# Patient Record
Sex: FEMALE | Race: WHITE | NOT HISPANIC OR LATINO | Employment: FULL TIME
[De-identification: names, ages, dates, MRNs, and addresses within clinical notes are randomized per-mention and may not be internally consistent; named-entity substitution may affect disease eponyms.]

---

## 2017-06-30 DIAGNOSIS — L40.9 PSORIASIS: ICD-10-CM

## 2017-06-30 NOTE — TELEPHONE ENCOUNTER
fluocinolone acetonide 0.01 % OIL 20 mL 1 6/24/2016       Left a message to pt return our call, needs to schedule an appointment. Last OV was on 06/03/2016        Last Written Prescription Date: 06/24/2016  Last Fill Quantity: 20 ml,  # refills: 1   Last Office Visit with [facility], [facility] or [facility] prescribing provider: 06/03/2016

## 2017-07-11 RX ORDER — FLUOCINOLONE ACETONIDE 0.11 MG/ML
OIL AURICULAR (OTIC)
Qty: 20 ML | Refills: 0 | Status: SHIPPED | OUTPATIENT
Start: 2017-07-11 | End: 2018-05-18

## 2017-08-05 ENCOUNTER — HEALTH MAINTENANCE LETTER (OUTPATIENT)
Age: 57
End: 2017-08-05

## 2018-05-18 ENCOUNTER — MYC MEDICAL ADVICE (OUTPATIENT)
Dept: FAMILY MEDICINE | Facility: CLINIC | Age: 58
End: 2018-05-18

## 2018-05-18 DIAGNOSIS — L40.9 PSORIASIS: ICD-10-CM

## 2018-05-18 RX ORDER — FLUOCINOLONE ACETONIDE 0.11 MG/ML
OIL AURICULAR (OTIC)
Qty: 20 ML | Refills: 0 | Status: SHIPPED | OUTPATIENT
Start: 2018-05-18 | End: 2018-08-24

## 2018-05-18 RX ORDER — TRIAMCINOLONE ACETONIDE 5 MG/G
CREAM TOPICAL
Qty: 60 G | Refills: 0 | Status: SHIPPED | OUTPATIENT
Start: 2018-05-18 | End: 2018-08-24

## 2018-05-18 NOTE — TELEPHONE ENCOUNTER
Dr. Wellington:    Patient requesting pended medications.   I sent [employer] response to patient letting her know she is over-due for appt with you (2 YEARS AGO)    Advised that she call to schedule.  Please address pended medications.    Anay Vilchis RN

## 2018-08-11 ENCOUNTER — HEALTH MAINTENANCE LETTER (OUTPATIENT)
Age: 58
End: 2018-08-11

## 2018-08-16 ENCOUNTER — HOSPITAL ENCOUNTER (OUTPATIENT)
Dept: MAMMOGRAPHY | Facility: CLINIC | Age: 58
End: 2018-08-16
Attending: INTERNAL MEDICINE
Payer: COMMERCIAL

## 2018-08-16 DIAGNOSIS — Z12.31 VISIT FOR SCREENING MAMMOGRAM: ICD-10-CM

## 2018-08-16 PROCEDURE — 77067 SCR MAMMO BI INCL CAD: CPT

## 2018-08-23 NOTE — PROGRESS NOTES
SUBJECTIVE:   CC: Joanie Multani is an 57 year old woman who presents for preventive health visit.     Healthy Habits:    Do you get at least three servings of calcium containing foods daily (dairy, green leafy vegetables, etc.)? yes    Amount of exercise or daily activities, outside of work: *2 days    Problems taking medications regularly No    Medication side effects: No    Have you had an eye exam in the past two years? yes    Do you see a dentist twice per year? no    Do you have sleep apnea, excessive snoring or daytime drowsiness?no      Patient is trying to lose weight   Ideal protein program  No other issues  Skin issues are stable    Today's PHQ-2 Score:   PHQ-2 (  [employer]) 6/3/2016 6/3/2016   Q1: Little interest or pleasure in doing things 1 1   Q2: Feeling down, depressed or hopeless 1 1   PHQ-2 Score 2 2   Q1: Little interest or pleasure in doing things - -   Q2: Feeling down, depressed or hopeless - -   PHQ-2 Score - -       Abuse: Current or Past(Physical, Sexual or Emotional)- No  Do you feel safe in your environment - Yes    Social History   Substance Use Topics     Smoking status: Never Smoker     Smokeless tobacco: Never Used     Alcohol use Yes      Comment: 3-5 drinks/week     If you drink alcohol do you typically have >3 drinks per day or >7 drinks per week? No                     Reviewed orders with patient.  Reviewed health maintenance and updated orders accordingly - Yes  Patient Active Problem List   Diagnosis     Allergic rhinitis     Chondromalacia of patella     Obesity     CARDIOVASCULAR SCREENING; LDL GOAL LESS THAN 160     Psoriasis     Seasonal allergic rhinitis     Past Surgical History:   Procedure Laterality Date     C DEXA,BONE DENSITY,APPENDICULR SKELTN      nl bmd     C NONSPECIFIC PROCEDURE      Tonsillectomy     C NONSPECIFIC PROCEDURE       [id number]     C NONSPECIFIC PROCEDURE  3/08    lt radial closed fracture      COLONOSCOPY      nl       Social History    Substance Use Topics     Smoking status: Never Smoker     Smokeless tobacco: Never Used     Alcohol use Yes      Comment: 3-5 drinks/week     Family History   Problem Relation Age of Onset     Hypertension Maternal Grandfather      Diabetes Maternal Grandfather      Diabetes Maternal Grandmother      Diabetes Paternal Grandfather      Depression Father      Circulatory Maternal Grandfather      bleeding prob     Depression Daughter          Current Outpatient Prescriptions   Medication Sig Dispense Refill     cetirizine-psuedoePHEDrine (ZYRTEC-D) 5-120 MG per tablet Take 1 tablet by mouth daily 60 tablet 2     fluocinolone (SYNALAR) 0.01 % external solution Please re issue in drop formula rather than squeeze bottle 30 mL 1     fluocinolone acetonide 0.01 % OIL INSTILL 5 DROPS INTO THE AFFECTED EAR TWICE DAILY 20 mL 3     mometasone (NASONEX) 50 MCG/ACT nasal spray Spray 2 sprays into both nostrils daily (Patient taking differently: Spray 2 sprays into both nostrils daily as needed ) 1 Box 1     triamcinolone (KENALOG) 0.5 % cream Apply sparingly to affected area three times daily. 60 g 3       Patient over age 50, mutual decision to screen reflected in health maintenance.    Pertinent mammograms are reviewed under the imaging tab.  History of abnormal Pap smear: NO - age 30-65 PAP every 5 years with negative HPV co-testing recommended  PAP / HPV Latest Ref Rng & Units 6/3/2016 12/17/2012 1/4/2010   PAP - NIL NIL NIL   HPV 16 DNA NEG Negative - -   HPV 18 DNA NEG Negative - -   OTHER HR HPV NEG Negative - -     Reviewed and updated as needed this visit by clinical staff  Allergies  Meds  Problems         Reviewed and updated as needed this visit by Provider  Allergies  Meds  Problems            ROS:  10 point ROS of systems including Constitutional, Eyes, Respiratory, Cardiovascular, Gastroenterology, Genitourinary, Integumentary, Muscularskeletal, Psychiatric were all negative except for pertinent positives  "noted in my HPI.    OBJECTIVE:   /90 (Cuff Size: Adult Large)  Pulse 68  Ht 5' 5\" (1.651 m)  Wt 213 lb (96.6 kg)  LMP 05/14/2014  SpO2 97%  BMI 35.45 kg/m2  EXAM:  GENERAL APPEARANCE: healthy, alert and no distress  EYES: Eyes grossly normal to inspection, PERRL and conjunctivae and sclerae normal  HENT: ear canals and TM's normal, nose and mouth without ulcers or lesions, oropharynx clear and oral mucous membranes moist  NECK: no adenopathy, no asymmetry, masses, or scars and thyroid normal to palpation  RESP: lungs clear to auscultation - no rales, rhonchi or wheezes  BREAST: normal without masses, tenderness or nipple discharge and no palpable axillary masses or adenopathy  CV: regular rate and rhythm, normal S1 S2, no S3 or S4, no murmur, click or rub, no peripheral edema and peripheral pulses strong  ABDOMEN: soft, nontender, no hepatosplenomegaly, no masses and bowel sounds normal  MS: no musculoskeletal defects are noted and gait is age appropriate without ataxia  SKIN: no suspicious lesions or rashes  NEURO: Normal strength and tone, sensory exam grossly normal, mentation intact and speech normal  PSYCH: mentation appears normal and affect normal/bright        ASSESSMENT/PLAN:   Joanie was seen today for physical.    Diagnoses and all orders for this visit:    Routine general medical examination at a health care facility  Mammogram   Colon uptodate, due next year   Pap done in 2016    She will do high protein diet  She will she   Psoriasis  -     Comprehensive metabolic panel  -     fluocinolone acetonide 0.01 % OIL; INSTILL 5 DROPS INTO THE AFFECTED EAR TWICE DAILY  -     triamcinolone (KENALOG) 0.5 % cream; Apply sparingly to affected area three times daily.  -     Vitamin D Deficiency  -     Vitamin B12    Chondromalacia of patella, unspecified laterality  -     Comprehensive metabolic panel  Weight loss advised  Bilateral impacted cerumen   fluocinolone acetonide 0.01 % OIL; INSTILL 5 DROPS " "INTO THE AFFECTED EAR TWICE DAILY  Screening for HIV (human immunodeficiency virus)  -     HIV Screening    CARDIOVASCULAR SCREENING; LDL GOAL LESS THAN 160  -     Lipid panel reflex to direct LDL Fasting        COUNSELING:   Reviewed preventive health counseling, as reflected in patient instructions       Regular exercise       Healthy diet/nutrition    BP Readings from Last 1 Encounters:   08/24/18 138/90     Estimated body mass index is 35.45 kg/(m^2) as calculated from the following:    Height as of this encounter: 5' 5\" (1.651 m).    Weight as of this encounter: 213 lb (96.6 kg).      Weight management plan: Discussed healthy diet and exercise guidelines and patient will follow up in 6 months in clinic to re-evaluate.     reports that she has never smoked. She has never used smokeless tobacco.      Counseling Resources:  ATP IV Guidelines  Pooled Cohorts Equation Calculator  Breast Cancer Risk Calculator  FRAX Risk Assessment  ICSI Preventive Guidelines  Dietary Guidelines for Americans, 2010  USDA's MyPlate  ASA Prophylaxis  Lung CA Screening    Elsa Wellington MD  [facility]  "

## 2018-08-23 NOTE — PATIENT INSTRUCTIONS
Calcium is present in   Cheese  2. Yogurt  3. Milk  4. Sardines  5. Dark leafy greens like spinach, kale, turnips, and hilaria greens  6. Fortified cereals such as Total, Raisin Bran, Corn Flakes (They have a lot of calcium in one serving.)  7. Fortified orange juice  8. Soybeans  9. Fortified soymilk (Not all soymilk is a good source of calcium, so it's best to check the label.)  10. Enriched breads, grains, and waffles    Centrum for adults    Vicks Vaporub  Preventive Health Recommendations  Female Ages 50 - 64    Yearly exam: See your health care provider every year in order to  o Review health changes.   o Discuss preventive care.    o Review your medicines if your doctor has prescribed any.      Get a Pap test every three years (unless you have an abnormal result and your provider advises testing more often).    If you get Pap tests with HPV test, you only need to test every 5 years, unless you have an abnormal result.     You do not need a Pap test if your uterus was removed (hysterectomy) and you have not had cancer.    You should be tested each year for STDs (sexually transmitted diseases) if you're at risk.     Have a mammogram every 1 to 2 years.    Have a colonoscopy at age 50, or have a yearly FIT test (stool test). These exams screen for colon cancer.      Have a cholesterol test every 5 years, or more often if advised.    Have a diabetes test (fasting glucose) every three years. If you are at risk for diabetes, you should have this test more often.     If you are at risk for osteoporosis (brittle bone disease), think about having a bone density scan (DEXA).    Shots: Get a flu shot each year. Get a tetanus shot every 10 years.    Nutrition:     Eat at least 5 servings of fruits and vegetables each day.    Eat whole-grain bread, whole-wheat pasta and brown rice instead of white grains and rice.    Get adequate Calcium and Vitamin D.     Lifestyle    Exercise at least 150 minutes a week (30 minutes a  day, 5 days a week). This will help you control your weight and prevent disease.    Limit alcohol to one drink per day.    No smoking.     Wear sunscreen to prevent skin cancer.     See your dentist every six months for an exam and cleaning.    See your eye doctor every 1 to 2 years.

## 2018-08-24 ENCOUNTER — OFFICE VISIT (OUTPATIENT)
Dept: FAMILY MEDICINE | Facility: CLINIC | Age: 58
End: 2018-08-24
Payer: COMMERCIAL

## 2018-08-24 VITALS
SYSTOLIC BLOOD PRESSURE: 138 MMHG | WEIGHT: 213 LBS | DIASTOLIC BLOOD PRESSURE: 90 MMHG | BODY MASS INDEX: 35.49 KG/M2 | OXYGEN SATURATION: 97 % | HEIGHT: 65 IN | HEART RATE: 68 BPM

## 2018-08-24 DIAGNOSIS — L40.9 PSORIASIS: ICD-10-CM

## 2018-08-24 DIAGNOSIS — M22.40 CHONDROMALACIA OF PATELLA, UNSPECIFIED LATERALITY: ICD-10-CM

## 2018-08-24 DIAGNOSIS — Z13.6 CARDIOVASCULAR SCREENING; LDL GOAL LESS THAN 160: ICD-10-CM

## 2018-08-24 DIAGNOSIS — H61.23 BILATERAL IMPACTED CERUMEN: ICD-10-CM

## 2018-08-24 DIAGNOSIS — Z00.00 ROUTINE GENERAL MEDICAL EXAMINATION AT A HEALTH CARE FACILITY: Primary | ICD-10-CM

## 2018-08-24 DIAGNOSIS — Z11.4 SCREENING FOR HIV (HUMAN IMMUNODEFICIENCY VIRUS): ICD-10-CM

## 2018-08-24 LAB
ALBUMIN SERPL-MCNC: 3.9 G/DL (ref 3.4–5)
ALP SERPL-CCNC: 75 U/L (ref 40–150)
ALT SERPL W P-5'-P-CCNC: 33 U/L (ref 0–50)
ANION GAP SERPL CALCULATED.3IONS-SCNC: 8 MMOL/L (ref 3–14)
AST SERPL W P-5'-P-CCNC: 21 U/L (ref 0–45)
BILIRUB SERPL-MCNC: 0.9 MG/DL (ref 0.2–1.3)
BUN SERPL-MCNC: 15 MG/DL (ref 7–30)
CALCIUM SERPL-MCNC: 9.2 MG/DL (ref 8.5–10.1)
CHLORIDE SERPL-SCNC: 107 MMOL/L (ref 94–109)
CHOLEST SERPL-MCNC: 216 MG/DL
CO2 SERPL-SCNC: 28 MMOL/L (ref 20–32)
CREAT SERPL-MCNC: 0.81 MG/DL (ref 0.52–1.04)
GFR SERPL CREATININE-BSD FRML MDRD: 73 ML/MIN/1.7M2
GLUCOSE SERPL-MCNC: 92 MG/DL (ref 70–99)
HDLC SERPL-MCNC: 63 MG/DL
LDLC SERPL CALC-MCNC: 122 MG/DL
NONHDLC SERPL-MCNC: 153 MG/DL
POTASSIUM SERPL-SCNC: 4.3 MMOL/L (ref 3.4–5.3)
PROT SERPL-MCNC: 7.4 G/DL (ref 6.8–8.8)
SODIUM SERPL-SCNC: 143 MMOL/L (ref 133–144)
TRIGL SERPL-MCNC: 153 MG/DL
VIT B12 SERPL-MCNC: 435 PG/ML (ref 193–986)

## 2018-08-24 PROCEDURE — 82306 VITAMIN D 25 HYDROXY: CPT | Performed by: INTERNAL MEDICINE

## 2018-08-24 PROCEDURE — 36415 COLL VENOUS BLD VENIPUNCTURE: CPT | Performed by: INTERNAL MEDICINE

## 2018-08-24 PROCEDURE — 80053 COMPREHEN METABOLIC PANEL: CPT | Performed by: INTERNAL MEDICINE

## 2018-08-24 PROCEDURE — 80061 LIPID PANEL: CPT | Performed by: INTERNAL MEDICINE

## 2018-08-24 PROCEDURE — 87389 HIV-1 AG W/HIV-1&-2 AB AG IA: CPT | Performed by: INTERNAL MEDICINE

## 2018-08-24 PROCEDURE — 99396 PREV VISIT EST AGE 40-64: CPT | Performed by: INTERNAL MEDICINE

## 2018-08-24 PROCEDURE — 82607 VITAMIN B-12: CPT | Performed by: INTERNAL MEDICINE

## 2018-08-24 RX ORDER — TRIAMCINOLONE ACETONIDE 5 MG/G
CREAM TOPICAL
Qty: 60 G | Refills: 3 | Status: SHIPPED | OUTPATIENT
Start: 2018-08-24 | End: 2020-02-10

## 2018-08-24 RX ORDER — FLUOCINOLONE ACETONIDE 0.11 MG/ML
OIL AURICULAR (OTIC)
Qty: 20 ML | Refills: 3 | Status: SHIPPED | OUTPATIENT
Start: 2018-08-24 | End: 2020-02-10

## 2018-08-24 NOTE — MR AVS SNAPSHOT
After Visit Summary   8/24/2018    Joanie Multani    MRN: [id number]           Patient Information     Date Of Birth          1960        Visit Information        Provider Department      8/24/2018 7:15 AM Elsa Wellington MD [facility]        Today's Diagnoses     Routine general medical examination at a health care facility    -  1    Psoriasis        Chondromalacia of patella, unspecified laterality        Bilateral impacted cerumen        Screening for HIV (human immunodeficiency virus)        CARDIOVASCULAR SCREENING; LDL GOAL LESS THAN 160          Care Instructions    Calcium is present in   Cheese  2. Yogurt  3. Milk  4. Sardines  5. Dark leafy greens like spinach, kale, turnips, and hilaria greens  6. Fortified cereals such as Total, Raisin Bran, Corn Flakes (They have a lot of calcium in one serving.)  7. Fortified orange juice  8. Soybeans  9. Fortified soymilk (Not all soymilk is a good source of calcium, so it's best to check the label.)  10. Enriched breads, grains, and waffles    Centrum for adults    Vicks Vaporub  Preventive Health Recommendations  Female Ages 50 - 64    Yearly exam: See your health care provider every year in order to  o Review health changes.   o Discuss preventive care.    o Review your medicines if your doctor has prescribed any.      Get a Pap test every three years (unless you have an abnormal result and your provider advises testing more often).    If you get Pap tests with HPV test, you only need to test every 5 years, unless you have an abnormal result.     You do not need a Pap test if your uterus was removed (hysterectomy) and you have not had cancer.    You should be tested each year for STDs (sexually transmitted diseases) if you're at risk.     Have a mammogram every 1 to 2 years.    Have a colonoscopy at age 50, or have a yearly FIT test (stool test). These exams screen for colon cancer.      Have a cholesterol test every 5 years, or more  often if advised.    Have a diabetes test (fasting glucose) every three years. If you are at risk for diabetes, you should have this test more often.     If you are at risk for osteoporosis (brittle bone disease), think about having a bone density scan (DEXA).    Shots: Get a flu shot each year. Get a tetanus shot every 10 years.    Nutrition:     Eat at least 5 servings of fruits and vegetables each day.    Eat whole-grain bread, whole-wheat pasta and brown rice instead of white grains and rice.    Get adequate Calcium and Vitamin D.     Lifestyle    Exercise at least 150 minutes a week (30 minutes a day, 5 days a week). This will help you control your weight and prevent disease.    Limit alcohol to one drink per day.    No smoking.     Wear sunscreen to prevent skin cancer.     See your dentist every six months for an exam and cleaning.    See your eye doctor every 1 to 2 years.                Follow-ups after your visit        Follow-up notes from your care team     Return in about 1 year (around 8/24/2019) for Routine Visit, Fasting Labs, Physical Exam.      Who to contact     If you have questions or need follow up information about today's clinic visit or your schedule please contact [facility] directly at [phone].  Normal or non-critical lab and imaging results will be communicated to you by [employer], letter or phone within 4 business days after the clinic has received the results. If you do not hear from us within 7 days, please contact the clinic through [employer] or phone. If you have a critical or abnormal lab result, we will notify you by phone as soon as possible.  Submit refill requests through [employer] or call your pharmacy and they will forward the refill request to us. Please allow 3 business days for your refill to be completed.          Additional Information About Your Visit        [employer] Information     [employer] gives you secure access to your electronic health record. If you see a  "primary care provider, you can also send messages to your care team and make appointments. If you have questions, please call your primary care clinic.  If you do not have a primary care provider, please call [phone] and they will assist you.        Care EveryWhere ID     This is your Care EveryWhere ID. This could be used by other organizations to access your [facility] medical records  [id number]        Your Vitals Were     Pulse Height Last Period Pulse Oximetry BMI (Body Mass Index)       68 5' 5\" (1.651 m) 05/14/2014 97% 35.45 kg/m2        Blood Pressure from Last 3 Encounters:   08/24/18 138/90   06/03/16 132/85   03/12/15 120/75    Weight from Last 3 Encounters:   08/24/18 213 lb (96.6 kg)   06/03/16 199 lb (90.3 kg)   03/12/15 202 lb (91.6 kg)              We Performed the Following     Comprehensive metabolic panel     HIV Screening     Lipid panel reflex to direct LDL Fasting     Vitamin B12     Vitamin D Deficiency          Today's Medication Changes          These changes are accurate as of 8/24/18  7:35 AM.  If you have any questions, ask your nurse or doctor.               These medicines have changed or have updated prescriptions.        Dose/Directions    mometasone 50 MCG/ACT spray   Commonly known as:  NASONEX   This may have changed:    - when to take this  - reasons to take this   Used for:  Allergic rhinitis, cause unspecified        Dose:  2 spray   Spray 2 sprays into both nostrils daily   Quantity:  1 Box   Refills:  1         Stop taking these medicines if you haven't already. Please contact your care team if you have questions.     sertraline 50 MG tablet   Commonly known as:  ZOLOFT   Stopped by:  Elsa Wellington MD                Where to get your medicines      These medications were sent to [facility] [id number] [facility] [place] - [address]  [address] [place] [address]     Phone:  [phone]     fluocinolone acetonide 0.01 % Oil    triamcinolone " 0.5 % cream                Primary Care Provider Office Phone # Fax #    Bhari MD Eliz [phone] [address] [address]        Equal Access to Services     HENNA CAPELLAN : Hadii aad ku hadlorio Sojaxonali, waaxda luqadaha, qaybta kaalmada adecheryl, prema serna cody power. So [facility] [phone].    ATENCIÓN: Si habla español, tiene a alas disposición servicios gratuitos de asistencia lingüística. Llame al [phone].    We comply with applicable federal civil rights laws and [place] laws. We do not discriminate on the basis of race, color, national origin, age, disability, sex, sexual orientation, or gender identity.            Thank you!     Thank you for choosing [facility]  for your care. Our goal is always to provide you with excellent care. Hearing back from our patients is one way we can continue to improve our services. Please take a few minutes to complete the written survey that you may receive in the mail after your visit with us. Thank you!             Your Updated Medication List - Protect others around you: Learn how to safely use, store and throw away your medicines at www.disposemymeds.org.          This list is accurate as of 8/24/18  7:35 AM.  Always use your most recent med list.                   Brand Name Dispense Instructions for use Diagnosis    cetirizine-pseudoePHEDrine 5-120 MG per 12 hr tablet    zyrTEC-D    60 tablet    Take 1 tablet by mouth daily    Seasonal allergic rhinitis       fluocinolone 0.01 % solution    SYNALAR    30 mL    Please re issue in drop formula rather than squeeze bottle    Psoriasis       fluocinolone acetonide 0.01 % Oil     20 mL    INSTILL 5 DROPS INTO THE AFFECTED EAR TWICE DAILY    Psoriasis       mometasone 50 MCG/ACT spray    NASONEX    1 Box    Spray 2 sprays into both nostrils daily    Allergic rhinitis, cause unspecified       triamcinolone 0.5 % cream    KENALOG    60 g    Apply  sparingly to affected area three times daily.    Psoriasis

## 2018-08-27 LAB
DEPRECATED CALCIDIOL+CALCIFEROL SERPL-MC: 22 UG/L (ref 20–75)
HIV 1+2 AB+HIV1 P24 AG SERPL QL IA: NONREACTIVE

## 2019-09-21 ENCOUNTER — TRANSFERRED RECORDS (OUTPATIENT)
Dept: HEALTH INFORMATION MANAGEMENT | Facility: CLINIC | Age: 59
End: 2019-09-21

## 2019-09-30 ENCOUNTER — HEALTH MAINTENANCE LETTER (OUTPATIENT)
Age: 59
End: 2019-09-30

## 2019-10-29 ENCOUNTER — HEALTH MAINTENANCE LETTER (OUTPATIENT)
Age: 59
End: 2019-10-29

## 2019-12-10 ENCOUNTER — MYC MEDICAL ADVICE (OUTPATIENT)
Dept: FAMILY MEDICINE | Facility: CLINIC | Age: 59
End: 2019-12-10

## 2019-12-10 NOTE — TELEPHONE ENCOUNTER
Next 5 appointments (look out 90 days)    Jan 31, 2020  3:30 PM CST  PHYSICAL with Elsa Wellington MD  [facility] [address] [facility] [address] [phone]        Routing to TCs to contact patient to assist with re-scheduling her physical per below    Oxana DANIEL RN

## 2019-12-15 ENCOUNTER — HEALTH MAINTENANCE LETTER (OUTPATIENT)
Age: 59
End: 2019-12-15

## 2020-01-01 ENCOUNTER — TRANSFERRED RECORDS (OUTPATIENT)
Dept: MULTI SPECIALTY CLINIC | Facility: CLINIC | Age: 60
End: 2020-01-01

## 2020-01-15 ENCOUNTER — HOSPITAL ENCOUNTER (OUTPATIENT)
Dept: MAMMOGRAPHY | Facility: CLINIC | Age: 60
Discharge: HOME | End: 2020-01-15
Attending: INTERNAL MEDICINE | Admitting: INTERNAL MEDICINE
Payer: COMMERCIAL

## 2020-01-15 DIAGNOSIS — Z12.31 VISIT FOR SCREENING MAMMOGRAM: ICD-10-CM

## 2020-01-15 PROCEDURE — 77063 BREAST TOMOSYNTHESIS BI: CPT

## 2020-02-10 ENCOUNTER — OFFICE VISIT (OUTPATIENT)
Dept: FAMILY MEDICINE | Facility: CLINIC | Age: 60
End: 2020-02-10
Payer: COMMERCIAL

## 2020-02-10 VITALS
WEIGHT: 187 LBS | BODY MASS INDEX: 31.92 KG/M2 | HEIGHT: 64 IN | DIASTOLIC BLOOD PRESSURE: 73 MMHG | HEART RATE: 67 BPM | OXYGEN SATURATION: 97 % | SYSTOLIC BLOOD PRESSURE: 109 MMHG | TEMPERATURE: 98.3 F

## 2020-02-10 DIAGNOSIS — Z13.6 CARDIOVASCULAR SCREENING; LDL GOAL LESS THAN 130: ICD-10-CM

## 2020-02-10 DIAGNOSIS — Z00.00 ROUTINE GENERAL MEDICAL EXAMINATION AT A HEALTH CARE FACILITY: Primary | ICD-10-CM

## 2020-02-10 DIAGNOSIS — L40.9 PSORIASIS: ICD-10-CM

## 2020-02-10 DIAGNOSIS — Z12.4 SCREENING FOR MALIGNANT NEOPLASM OF CERVIX: ICD-10-CM

## 2020-02-10 LAB
ANION GAP SERPL CALCULATED.3IONS-SCNC: 5 MMOL/L (ref 3–14)
BUN SERPL-MCNC: 17 MG/DL (ref 7–30)
CALCIUM SERPL-MCNC: 9.2 MG/DL (ref 8.5–10.1)
CHLORIDE SERPL-SCNC: 108 MMOL/L (ref 94–109)
CHOLEST SERPL-MCNC: 199 MG/DL
CO2 SERPL-SCNC: 26 MMOL/L (ref 20–32)
CREAT SERPL-MCNC: 0.76 MG/DL (ref 0.52–1.04)
ERYTHROCYTE [DISTWIDTH] IN BLOOD BY AUTOMATED COUNT: 14.7 % (ref 10–15)
GFR SERPL CREATININE-BSD FRML MDRD: 86 ML/MIN/{1.73_M2}
GLUCOSE SERPL-MCNC: 79 MG/DL (ref 70–99)
HCT VFR BLD AUTO: 42.8 % (ref 35–47)
HDLC SERPL-MCNC: 71 MG/DL
HGB BLD-MCNC: 13.8 G/DL (ref 11.7–15.7)
LDLC SERPL CALC-MCNC: 112 MG/DL
MCH RBC QN AUTO: 28.8 PG (ref 26.5–33)
MCHC RBC AUTO-ENTMCNC: 32.2 G/DL (ref 31.5–36.5)
MCV RBC AUTO: 89 FL (ref 78–100)
NONHDLC SERPL-MCNC: 128 MG/DL
PLATELET # BLD AUTO: 256 10E9/L (ref 150–450)
POTASSIUM SERPL-SCNC: 4.1 MMOL/L (ref 3.4–5.3)
RBC # BLD AUTO: 4.8 10E12/L (ref 3.8–5.2)
SODIUM SERPL-SCNC: 139 MMOL/L (ref 133–144)
TRIGL SERPL-MCNC: 82 MG/DL
WBC # BLD AUTO: 4.5 10E9/L (ref 4–11)

## 2020-02-10 PROCEDURE — 87624 HPV HI-RISK TYP POOLED RSLT: CPT | Performed by: INTERNAL MEDICINE

## 2020-02-10 PROCEDURE — 80061 LIPID PANEL: CPT | Performed by: INTERNAL MEDICINE

## 2020-02-10 PROCEDURE — 85027 COMPLETE CBC AUTOMATED: CPT | Performed by: INTERNAL MEDICINE

## 2020-02-10 PROCEDURE — 36415 COLL VENOUS BLD VENIPUNCTURE: CPT | Performed by: INTERNAL MEDICINE

## 2020-02-10 PROCEDURE — 99396 PREV VISIT EST AGE 40-64: CPT | Performed by: INTERNAL MEDICINE

## 2020-02-10 PROCEDURE — G0145 SCR C/V CYTO,THINLAYER,RESCR: HCPCS | Performed by: INTERNAL MEDICINE

## 2020-02-10 PROCEDURE — 80048 BASIC METABOLIC PNL TOTAL CA: CPT | Performed by: INTERNAL MEDICINE

## 2020-02-10 RX ORDER — FLUOCINOLONE ACETONIDE 0.11 MG/ML
OIL AURICULAR (OTIC)
Qty: 20 ML | Refills: 3 | Status: SHIPPED | OUTPATIENT
Start: 2020-02-10 | End: 2023-10-02

## 2020-02-10 RX ORDER — FLUOCINOLONE ACETONIDE 0.1 MG/ML
SOLUTION TOPICAL
Qty: 30 ML | Refills: 1 | Status: SHIPPED | OUTPATIENT
Start: 2020-02-10

## 2020-02-10 RX ORDER — TRIAMCINOLONE ACETONIDE 5 MG/G
CREAM TOPICAL
Qty: 60 G | Refills: 3 | Status: SHIPPED | OUTPATIENT
Start: 2020-02-10 | End: 2023-10-02

## 2020-02-10 ASSESSMENT — MIFFLIN-ST. JEOR: SCORE: 1417.91

## 2020-02-10 NOTE — PROGRESS NOTES
SUBJECTIVE:   CC: Joanie Multani is an 59 year old woman who presents for preventive health visit.     Healthy Habits:     Getting at least 3 servings of Calcium per day:  Yes    Bi-annual eye exam:  Yes    Dental care twice a year:  Yes    Sleep apnea or symptoms of sleep apnea:  None    Diet:  Regular (no restrictions) and Carbohydrate counting    Frequency of exercise:  1 day/week    Duration of exercise:  15-30 minutes    Taking medications regularly:  Not Applicable    Barriers to taking medications:  None    Medication side effects:  Not applicable    PHQ-2 Total Score: 0    Additional concerns today:  No    Patient is extremely pleasant 59 years old who is trying to lose weight  She is generally healthy and 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 Yes    Have you ever done Advance Care Planning? (For example, a Health Directive, POLST, or a discussion with a medical provider or your loved ones about your wishes): No, advance care planning information given to patient to review.  Patient plans to discuss their wishes with loved ones or provider.      Social History     Tobacco Use     Smoking status: Never Smoker     Smokeless tobacco: Never Used   Substance Use Topics     Alcohol use: Yes     Comment: 3-5 drinks/week     If you drink alcohol do you typically have >3 drinks per day or >7 drinks per week? No    eviewed orders with patient.  Reviewed health maintenance and updated orders accordingly - Yes  BP Readings  from Last 3 Encounters:   02/10/20 109/73   18 138/90   16 132/85    Wt Readings from Last 3 Encounters:   02/10/20 84.8 kg (187 lb)   18 96.6 kg (213 lb)   16 90.3 kg (199 lb)                  Patient Active Problem List   Diagnosis     Allergic rhinitis     Chondromalacia of patella     Obesity     CARDIOVASCULAR SCREENING; LDL GOAL LESS THAN 160     Psoriasis     Seasonal allergic rhinitis     Past Surgical History:   Procedure Laterality Date     C DEXA,BONE DENSITY,APPENDICULR SKELTN      nl bmd     C NONSPECIFIC PROCEDURE      Tonsillectomy     C NONSPECIFIC PROCEDURE       [id number]     C NONSPECIFIC PROCEDURE  3/08    lt radial closed fracture      COLONOSCOPY      nl       Social History     Tobacco Use     Smoking status: Never Smoker     Smokeless tobacco: Never Used   Substance Use Topics     Alcohol use: Yes     Comment: 3-5 drinks/week     Family History   Problem Relation Age of Onset     Depression Father      Diabetes Maternal Grandmother      Hypertension Maternal Grandfather      Diabetes Maternal Grandfather      Circulatory Maternal Grandfather         bleeding prob     Diabetes Paternal Grandfather      Depression Daughter      Depression Brother      Depression Sister          Current Outpatient Medications   Medication Sig Dispense Refill     cetirizine-psuedoePHEDrine (ZYRTEC-D) 5-120 MG per tablet Take 1 tablet by mouth daily (Patient taking differently: Take 1 tablet by mouth daily as needed ) 60 tablet 2     fluocinolone (SYNALAR) 0.01 % external solution Please re issue in drop formula rather than squeeze bottle 30 mL 1     fluocinolone acetonide 0.01 % OIL INSTILL 5 DROPS INTO THE AFFECTED EAR TWICE DAILY 20 mL 3     mometasone (NASONEX) 50 MCG/ACT nasal spray Spray 2 sprays into both nostrils daily (Patient taking differently: Spray 2 sprays into both nostrils daily as needed ) 1 Box 1     triamcinolone (KENALOG) 0.5 % cream Apply sparingly to affected  "area three times daily. 60 g 3       Mammogram Screening: Patient over age 50, mutual decision to screen reflected in health maintenance.    Pertinent mammograms are reviewed under the imaging tab.  History of abnormal Pap smear: NO - age 30- 65 PAP every 3 years recommended  PAP / HPV Latest Ref Rng & Units 6/3/2016 2012 2010   PAP - NIL NIL NIL   HPV 16 DNA NEG Negative - -   HPV 18 DNA NEG Negative - -   OTHER HR HPV NEG Negative - -     Reviewed and updated as needed this visit by clinical staff  Tobacco  Allergies  Meds  Problems  Med Hx  Surg Hx  Fam Hx         Reviewed and updated as needed this visit by Provider  Tobacco  Allergies  Meds  Problems  Med Hx  Surg Hx  Fam Hx        Past Medical History:   Diagnosis Date     Allergic rhinitis, cause unspecified     seasonal     Arthritis     left knee     Chondromalacia of patella     rt, saw ortho Dr. Siu had xray, MRI     Fracture closed of lower end of forearm 2008    skating     Obesity      Psoriasis 2/3/2014     Viral exanthem, unspecified     parvovirus, 3/04      OB History    Para Term  AB Living   3 3 3 0 0 3   SAB TAB Ectopic Multiple Live Births   0 0 0 0 0      # Outcome Date GA Lbr Arcadio/2nd Weight Sex Delivery Anes PTL Lv   3 Term            2 Term            1 Term                Review of Systems  10 point ROS of systems including Constitutional, Eyes, Respiratory, Cardiovascular, Gastroenterology, Genitourinary, Integumentary, Muscularskeletal, Psychiatric were all negative except for pertinent positives noted in my HPI.       OBJECTIVE:   /73 (BP Location: Left arm, Patient Position: Sitting, Cuff Size: Adult Large)   Pulse 67   Temp 98.3  F (36.8  C) (Oral)   Ht 1.625 m (5' 3.98\")   Wt 84.8 kg (187 lb)   LMP 2014   SpO2 97%   BMI 32.12 kg/m    Physical Exam  GENERAL APPEARANCE: healthy, alert and no distress  EYES: Eyes grossly normal to inspection, PERRL and conjunctivae and " sclerae normal  HENT: ear canals and TM's normal, nose and mouth without ulcers or lesions, oropharynx clear and oral mucous membranes moist  NECK: no adenopathy, no asymmetry, masses, or scars and thyroid normal to palpation  RESP: lungs clear to auscultation - no rales, rhonchi or wheezes  BREAST: normal without masses, tenderness or nipple discharge and no palpable axillary masses or adenopathy  CV: regular rate and rhythm, normal S1 S2, no S3 or S4, no murmur, click or rub, no peripheral edema and peripheral pulses strong  ABDOMEN: soft, nontender, no hepatosplenomegaly, no masses and bowel sounds normal   (female): normal female external genitalia, normal urethral meatus, vaginal mucosal atrophy noted, normal cervix, adnexae, and uterus without masses or abnormal discharge  MS: no musculoskeletal defects are noted and gait is age appropriate without ataxia  SKIN: no suspicious lesions or rashes  NEURO: Normal strength and tone, sensory exam grossly normal, mentation intact and speech normal  PSYCH: mentation appears normal and affect normal/bright        ASSESSMENT/PLAN:   Joanie was seen today for physical.    Diagnoses and all orders for this visit:    Routine general medical examination at a health care facility    Psoriasis  -     Basic metabolic panel  -     CBC with platelets  -     fluocinolone (SYNALAR) 0.01 % solution; Please re issue in drop formula rather than squeeze bottle  -     fluocinolone acetonide 0.01 % OIL; INSTILL 5 DROPS INTO THE AFFECTED EAR TWICE DAILY  -     triamcinolone (ARISTOCORT HP) 0.5 % external cream; Apply sparingly to affected area three times daily.    Screening for malignant neoplasm of cervix  -     Pap imaged thin layer screen with HPV - recommended age 30 - 65 years (select HPV order below)  -     HPV High Risk Types DNA Cervical    CARDIOVASCULAR SCREENING; LDL GOAL LESS THAN 130  -     Lipid panel reflex to direct LDL Fasting      Patient will take a lab test today and  "will continue to use the Synalar solutions and ointments  Her psoriasis is under excellent control and mammogram is normal  She is scheduled for colonoscopy and is up-to-date on immunizations  She has lost 30 pounds since last year  COUNSELING:  Reviewed preventive health counseling, as reflected in patient instructions       Regular exercise       Healthy diet/nutrition    Estimated body mass index is 32.12 kg/m  as calculated from the following:    Height as of this encounter: 1.625 m (5' 3.98\").    Weight as of this encounter: 84.8 kg (187 lb).    Weight management plan: Discussed healthy diet and exercise guidelines     reports that she has never smoked. She has never used smokeless tobacco.      Counseling Resources:  ATP IV Guidelines  Pooled Cohorts Equation Calculator  Breast Cancer Risk Calculator  FRAX Risk Assessment  ICSI Preventive Guidelines  Dietary Guidelines for Americans, 2010  USDA's MyPlate  ASA Prophylaxis  Lung CA Screening    Elsa Wellington MD  [facility]  "

## 2020-02-12 LAB
COPATH REPORT: NORMAL
PAP: NORMAL

## 2020-02-13 LAB
FINAL DIAGNOSIS: NORMAL
HPV HR 12 DNA CVX QL NAA+PROBE: NEGATIVE
HPV16 DNA SPEC QL NAA+PROBE: NEGATIVE
HPV18 DNA SPEC QL NAA+PROBE: NEGATIVE
SPECIMEN DESCRIPTION: NORMAL
SPECIMEN SOURCE CVX/VAG CYTO: NORMAL

## 2020-07-27 ENCOUNTER — TELEPHONE (OUTPATIENT)
Dept: FAMILY MEDICINE | Facility: CLINIC | Age: 60
End: 2020-07-27

## 2020-07-27 NOTE — TELEPHONE ENCOUNTER
Panel Management Review      Patient has the following on her problem list: None      Composite cancer screening  Chart review shows that this patient is due/due soon for the following Colonoscopy  Summary:    Patient is due/failing the following:   COLONOSCOPY    Action needed:   Patient needs office visit for colonoscopy.    Type of outreach:    Sent [employer] message.    Questions for provider review:    None                                                                                                                                    Delmi Colby MA on 7/27/2020 at 11:50 AM

## 2020-10-15 ENCOUNTER — TRANSFERRED RECORDS (OUTPATIENT)
Dept: HEALTH INFORMATION MANAGEMENT | Facility: CLINIC | Age: 60
End: 2020-10-15

## 2021-01-15 ENCOUNTER — HEALTH MAINTENANCE LETTER (OUTPATIENT)
Age: 61
End: 2021-01-15

## 2021-03-20 ENCOUNTER — HEALTH MAINTENANCE LETTER (OUTPATIENT)
Age: 61
End: 2021-03-20

## 2022-04-10 ENCOUNTER — HEALTH MAINTENANCE LETTER (OUTPATIENT)
Age: 62
End: 2022-04-10

## 2022-10-16 ENCOUNTER — HEALTH MAINTENANCE LETTER (OUTPATIENT)
Age: 62
End: 2022-10-16

## 2023-06-01 ENCOUNTER — HEALTH MAINTENANCE LETTER (OUTPATIENT)
Age: 63
End: 2023-06-01

## 2023-09-05 ENCOUNTER — MYC MEDICAL ADVICE (OUTPATIENT)
Dept: FAMILY MEDICINE | Facility: CLINIC | Age: 63
End: 2023-09-05

## 2023-09-22 NOTE — TELEPHONE ENCOUNTER
Before calling Pt back,    Dr. Wellington, are you willing to take on Pt's daughter as a new Pt? If not, do you have a specific recommendation on who she should see, or just anyone taking new patients?    Thank you,  Farzaneh

## 2023-09-26 ENCOUNTER — MYC MEDICAL ADVICE (OUTPATIENT)
Dept: DERMATOLOGY | Facility: CLINIC | Age: 63
End: 2023-09-26
Payer: COMMERCIAL

## 2023-09-27 NOTE — TELEPHONE ENCOUNTER
Patient Contact    Attempt # 1    Was call answered?  No.  Left message on voicemail with information to call nurse back at [phone].   Sent a my chart message advising scheduled an appt for Monday 10/2 at 8;15 am with Page Leong at 8[facility]15 [facility].  Advised to respond to my chart message or call nurse back.    Sobia MILLER RN  [facility] [place]  [phone]

## 2023-10-02 ENCOUNTER — OFFICE VISIT (OUTPATIENT)
Dept: FAMILY MEDICINE | Facility: CLINIC | Age: 63
End: 2023-10-02
Payer: COMMERCIAL

## 2023-10-02 DIAGNOSIS — L85.3 XEROSIS CUTIS: Primary | ICD-10-CM

## 2023-10-02 DIAGNOSIS — L81.0 POST-INFLAMMATORY HYPERPIGMENTATION: ICD-10-CM

## 2023-10-02 DIAGNOSIS — L40.9 PSORIASIS: ICD-10-CM

## 2023-10-02 PROCEDURE — 99204 OFFICE O/P NEW MOD 45 MIN: CPT | Performed by: PHYSICIAN ASSISTANT

## 2023-10-02 RX ORDER — TRIAMCINOLONE ACETONIDE 5 MG/G
CREAM TOPICAL
Qty: 60 G | Refills: 3 | Status: SHIPPED | OUTPATIENT
Start: 2023-10-02

## 2023-10-02 RX ORDER — FLUOCINOLONE ACETONIDE 0.11 MG/ML
OIL AURICULAR (OTIC)
Qty: 20 ML | Refills: 4 | Status: SHIPPED | OUTPATIENT
Start: 2023-10-02

## 2023-10-02 RX ORDER — HYDROQUINONE 40 MG/G
CREAM TOPICAL
Qty: 28.35 G | Refills: 1 | Status: SHIPPED | OUTPATIENT
Start: 2023-10-02 | End: 2024-01-08

## 2023-10-02 RX ORDER — TACROLIMUS 1 MG/G
OINTMENT TOPICAL
Qty: 60 G | Refills: 4 | Status: SHIPPED | OUTPATIENT
Start: 2023-10-02

## 2023-10-02 ASSESSMENT — PAIN SCALES - GENERAL: PAINLEVEL: NO PAIN (0)

## 2023-10-02 NOTE — LETTER
10/2/2023         RE: Joanie Multnai  [address]  [place] [address]        Dear Colleague,    Thank you for referring your patient, Joanie Multani, to the [facility] [place]. Please see a copy of my visit note below.    [facility] Dermatology Note  Encounter Date: Oct 2, 2023  Office Visit      Dermatology Problem List:  1. Psoriasis    -triamcinolone 0.5% for elbows, fluocinolone 0.01% drops for ears, tacrolimus 0.1% ointment for face  2. Postinflammatory hyperpigmentation of left shin    -Hydroquinone BID    Social: Used to be   ____________________________________________    Assessment & Plan:  # Psoriasis. Elbows, ear canals, face   - Will refill triamcinolone 0.5% for her elbows.   - Will refill fluocinolone 0.01% drops for her ears.   - Start tacrolimus  0.1% ointment for her face.    - Recommend avoiding products that lean towards anti-aging for now    - Recommend CeraVe or Cetaphil cleanser, as well as CeraVe moisturizer jar.   - edu on chronicity, autoimmune etiology    # Postinflammatory hyperpigmentation of left shin   - Explain that after injuries, it takes 6-12 months to improve; however, with increase in age, it takes longer to heal.   - Start Hydroquinone twice a day for 12 weeks. Side effects discussed with the patient today.     # Xerosis   - cerave cream for full body moisturizing  - plan for FBSE at some point in next 1 year    Procedures Performed:   None.    Follow-up: 3 month(s) in-person, or earlier for new or changing lesions    Staff and Scribe:    Scribe Disclosure:  I, Sheldon Jay, am serving as a scribe to document services personally performed by Page Leong PA-C based on data collection and the provider's statements to me.      All risks, benefits and alternatives were discussed with patient.  Patient is in agreement and understands the assessment and plan.  All questions were answered.    Page Leong PA-C,  MPAS  [facility]: Phone: [phone], Fax: [phone]  [facility]: Phone: [phone],  Fax: [phone]  [facility] [place]: Phone: [phone], Fax: [phone]  ____________________________________________    CC: Derm Problem (C/o redness and flakiness and some discomfort/tenderness also some itching on face present over the past 1.5 months)      HPI:  Ms. Joanie Multani is a 62 year old female who presents today as a new patient for redness and flakiness, as well as some discomfort and tenderness on her face for the past 1.5 months. Patient also reports associated itching on her face. She states that her face has been bright red and peeling profusely. She notes that it started while she was in [place] when her symptoms started. She denies any chemical peels or any skin procedures. Patient reports history of eczema of her elbows, as well as ears. She has triamcinolone 0.5% cream.     Today, she reports that her symptoms are not bad.     Patient is otherwise feeling well, without additional concerns.    Labs:  None.    Physical Exam:  Vitals: [facility] 05/14/2014   SKIN: Focused examination of face, arms, hands was performed.   - No pitting of the nails.    - There are erythematous well demarcated plaques with silvery micaceous scale on the elbows and face.   - On the right shin, there is postinflammatory hyperpigmentation (brown patch).   - Mild erythema and scale on the face relative to the elbows.   - No other lesions of concern on areas examined.         Medications:  Current Outpatient Medications   Medication     cetirizine-psuedoePHEDrine (ZYRTEC-D) 5-120 MG per tablet     fluocinolone (SYNALAR) 0.01 % solution     fluocinolone acetonide oil 0.01 % ear drops     hydroquinone (WAYNE) 4 % external cream     mometasone (NASONEX) 50 MCG/ACT nasal spray     tacrolimus (PROTOPIC) 0.1 % external ointment      triamcinolone (ARISTOCORT HP) 0.5 % external cream     No current facility-administered medications for this visit.      Past Medical/Surgical History:   Patient Active Problem List   Diagnosis     Allergic rhinitis     Chondromalacia of patella     Obesity     CARDIOVASCULAR SCREENING; LDL GOAL LESS THAN 160     Psoriasis     Seasonal allergic rhinitis     Past Medical History:   Diagnosis Date     Allergic rhinitis, cause unspecified     seasonal     Arthritis     left knee     Chondromalacia of patella     rt, saw ortho Dr. Siu had xray, MRI     Fracture closed of lower end of forearm 12/22/2008    skating     Obesity      Psoriasis 2/3/2014     Viral exanthem, unspecified     parvovirus, 3/04                        Again, thank you for allowing me to participate in the care of your patient.        Sincerely,        Page Leong PA-C

## 2023-10-02 NOTE — PROGRESS NOTES
[facility] Dermatology Note  Encounter Date: Oct 2, 2023  Office Visit      Dermatology Problem List:  1. Psoriasis    -triamcinolone 0.5% for elbows, fluocinolone 0.01% drops for ears, tacrolimus 0.1% ointment for face  2. Postinflammatory hyperpigmentation of left shin    -Hydroquinone BID    Social: Used to be   ____________________________________________    Assessment & Plan:  # Psoriasis. Elbows, ear canals, face   - Will refill triamcinolone 0.5% for her elbows.   - Will refill fluocinolone 0.01% drops for her ears.   - Start tacrolimus  0.1% ointment for her face.    - Recommend avoiding products that lean towards anti-aging for now    - Recommend CeraVe or Cetaphil cleanser, as well as CeraVe moisturizer jar.   - edu on chronicity, autoimmune etiology    # Postinflammatory hyperpigmentation of left shin   - Explain that after injuries, it takes 6-12 months to improve; however, with increase in age, it takes longer to heal.   - Start Hydroquinone twice a day for 12 weeks. Side effects discussed with the patient today.     # Xerosis   - cerave cream for full body moisturizing  - plan for FBSE at some point in next 1 year    Procedures Performed:   None.    Follow-up: 3 month(s) in-person, or earlier for new or changing lesions    Staff and Scribe:    Scribe Disclosure:  Sheldon BETTENCOURT, am serving as a scribe to document services personally performed by Page Leong PA-C based on data collection and the provider's statements to me.      All risks, benefits and alternatives were discussed with patient.  Patient is in agreement and understands the assessment and plan.  All questions were answered.    Page Leong PA-C, MPAS  [facility] [place]: Phone: [phone], Fax: [phone]  [facility]: Phone: [phone],  Fax: [phone]  [facility]: Phone: [phone],  Fax: [phone]  ____________________________________________    CC: Derm Problem (C/o redness and flakiness and some discomfort/tenderness also some itching on face present over the past 1.5 months)      HPI:  Ms. Joanie Multani is a 62 year old female who presents today as a new patient for redness and flakiness, as well as some discomfort and tenderness on her face for the past 1.5 months. Patient also reports associated itching on her face. She states that her face has been bright red and peeling profusely. She notes that it started while she was in [place] when her symptoms started. She denies any chemical peels or any skin procedures. Patient reports history of eczema of her elbows, as well as ears. She has triamcinolone 0.5% cream.     Today, she reports that her symptoms are not bad.     Patient is otherwise feeling well, without additional concerns.    Labs:  None.    Physical Exam:  Vitals: LMP 05/14/2014   SKIN: Focused examination of face, arms, hands was performed.   - No pitting of the nails.    - There are erythematous well demarcated plaques with silvery micaceous scale on the elbows and face.   - On the right shin, there is postinflammatory hyperpigmentation (brown patch).   - Mild erythema and scale on the face relative to the elbows.   - No other lesions of concern on areas examined.         Medications:  Current Outpatient Medications   Medication    cetirizine-psuedoePHEDrine (ZYRTEC-D) 5-120 MG per tablet    fluocinolone (SYNALAR) 0.01 % solution    fluocinolone acetonide oil 0.01 % ear drops    hydroquinone (WAYNE) 4 % external cream    mometasone (NASONEX) 50 MCG/ACT nasal spray    tacrolimus (PROTOPIC) 0.1 % external ointment    triamcinolone (ARISTOCORT HP) 0.5 % external cream     No current facility-administered medications for this visit.      Past Medical/Surgical History:   Patient Active Problem List   Diagnosis    Allergic rhinitis    Chondromalacia of patella    Obesity     CARDIOVASCULAR SCREENING; LDL GOAL LESS THAN 160    Psoriasis    Seasonal allergic rhinitis     Past Medical History:   Diagnosis Date    Allergic rhinitis, cause unspecified     seasonal    Arthritis     left knee    Chondromalacia of patella     rt, saw ortho Dr. Siu had xray, MRI    Fracture closed of lower end of forearm 12/22/2008    skating    Obesity     Psoriasis 2/3/2014    Viral exanthem, unspecified     parvovirus, 3/04

## 2023-10-02 NOTE — PATIENT INSTRUCTIONS
Patient Education       Proper skin care from [facility]:    -Eliminate harsh soaps as they strip the natural oils from the skin, often resulting in dry itchy skin ( i.e. Dial, Zest, [place])  -Use mild soaps such as Cetaphil or Dove Sensitive Skin in the shower. You do not need to use soap on arms, legs, and trunk every time you shower unless visibly soiled.   -Avoid hot or cold showers.  -After showering, lightly dry off and apply moisturizing within 2-3 minutes. This will help trap moisture in the skin.   -Aggressive use of a moisturizer at least 1-2 times a day to the entire body (including -Vanicream, Cetaphil, Aquaphor or Cerave) and moisturize hands after every washing.  -We recommend using moisturizers that come in a tub that needs to be scooped out, not a pump. This has more of an oil base. It will hold moisture in your skin much better than a water base moisturizer. The above recommended are non-pore clogging.      Wear a sunscreen with at least SPF 30 on your face, ears, neck and V of the chest daily. Wear sunscreen on other areas of the body if those areas are exposed to the sun throughout the day. Sunscreens can contain physical and/or chemical blockers. Physical blockers are less likely to clog pores, these include zinc oxide and titanium dioxide. Reapply every two hour and after swimming.     Sunscreen examples: https://www.ewg.org/sunscreen/    UV radiation  UVA radiation remains constant throughout the day and throughout the year. It is a longer wavelength than UVB and therefore penetrates deeper into the skin leading to immediate and delayed tanning, photoaging, and skin cancer. 70-80% of UVA and UVB radiation occurs between the hours of 10am-2pm.  UVB radiation  UVB radiation causes the most harmful effects and is more significant during the summer months. However, snow and ice can reflect UVB radiation leading to skin damage during the winter months as well. UVB radiation is  responsible for tanning, burning, inflammation, delayed erythema (pinkness), pigmentation (brown spots), and skin cancer.     I recommend self monthly full body exams and yearly full body exams with a dermatology provider. If you develop a new or changing lesion please follow up for examination. Most skin cancers are pink and scaly or pink and pearly. However, we do see blue/brown/black skin cancers.  Consider the ABCDEs of melanoma when giving yourself your monthly full body exam ( don't forget the groin, buttocks, feet, toes, etc). A-asymmetry, B-borders, C-color, D-diameter, E-elevation or evolving. If you see any of these changes please follow up in clinic. If you cannot see your back I recommend purchasing a hand held mirror to use with a larger wall mirror.       Checking for Skin Cancer  You can find cancer early by checking your skin each month. There are 3 kinds of skin cancer. They are melanoma, basal cell carcinoma, and squamous cell carcinoma. Doing monthly skin checks is the best way to find new marks or skin changes. Follow the instructions below for checking your skin.   The ABCDEs of checking moles for melanoma   Check your moles or growths for signs of melanoma using ABCDE:   Asymmetry: the sides of the mole or growth don t match  Border: the edges are ragged, notched, or blurred  Color: the color within the mole or growth varies  Diameter: the mole or growth is larger than 6 mm (size of a pencil eraser)  Evolving: the size, shape, or color of the mole or growth is changing (evolving is not shown in the images below)    Checking for other types of skin cancer  Basal cell carcinoma or squamous cell carcinoma have symptoms such as:     A spot or mole that looks different from all other marks on your skin  Changes in how an area feels, such as itching, tenderness, or pain  Changes in the skin's surface, such as oozing, bleeding, or scaliness  A sore that does not heal  New swelling or redness beyond  the border of a mole    Who s at risk?  Anyone can get skin cancer. But you are at greater risk if you have:   Fair skin, light-colored hair, or light-colored eyes  Many moles or abnormal moles on your skin  A history of sunburns from sunlight or tanning beds  A family history of skin cancer  A history of exposure to radiation or chemicals  A weakened immune system  If you have had skin cancer in the past, you are at risk for recurring skin cancer.   How to check your skin  Do your monthly skin checkups in front of a full-length mirror. Check all parts of your body, including your:   Head (ears, face, neck, and scalp)  Torso (front, back, and sides)  Arms (tops, undersides, upper, and lower armpits)  Hands (palms, backs, and fingers, including under the nails)  Buttocks and genitals  Legs (front, back, and sides)  Feet (tops, soles, toes, including under the nails, and between toes)  If you have a lot of moles, take digital photos of them each month. Make sure to take photos both up close and from a distance. These can help you see if any moles change over time.   Most skin changes are not cancer. But if you see any changes in your skin, call your doctor right away. Only he or she can diagnose a problem. If you have skin cancer, seeing your doctor can be the first step toward getting the treatment that could save your life.   [employer] last reviewed this educational content on 4/1/2019 2000-2020 [employer]. [address] [place] [address], [place] [address]. All rights reserved. This information is not intended as a substitute for professional medical care. Always follow your healthcare professional's instructions.       When should I call my doctor?  If you are worsening or not improving, please, contact us or seek urgent care as noted below.     Who should I call with questions (adults)?  [facility] (adult and pediatric): [phone]  [facility]  [place] (adult): [phone]  [facility] ([place], [place], [place] and [place]) [phone]  For urgent needs outside of business hours call the [facility] at [phone] and ask for the dermatology resident on call to be paged  If this is a medical emergency and you are unable to reach an ER, Call 911      If you need a prescription refill, please contact your pharmacy. Refills are approved or denied by our Physicians during normal business hours, Monday through Fridays  Per office policy, refills will not be granted if you have not been seen within the past year (or sooner depending on your child's condition)

## 2024-01-08 ENCOUNTER — OFFICE VISIT (OUTPATIENT)
Dept: FAMILY MEDICINE | Facility: CLINIC | Age: 64
End: 2024-01-08
Payer: COMMERCIAL

## 2024-01-08 DIAGNOSIS — L40.9 PSORIASIS: Primary | ICD-10-CM

## 2024-01-08 DIAGNOSIS — L81.0 POST-INFLAMMATORY HYPERPIGMENTATION: ICD-10-CM

## 2024-01-08 PROCEDURE — 99214 OFFICE O/P EST MOD 30 MIN: CPT | Performed by: PHYSICIAN ASSISTANT

## 2024-01-08 RX ORDER — HYDROQUINONE 40 MG/G
CREAM TOPICAL
Qty: 28.35 G | Refills: 1 | Status: SHIPPED | OUTPATIENT
Start: 2024-01-08

## 2024-01-08 ASSESSMENT — PAIN SCALES - GENERAL: PAINLEVEL: NO PAIN (0)

## 2024-01-08 NOTE — PROGRESS NOTES
[facility] Note  Encounter Date: Jan 8, 2024  Office Visit      Dermatology Problem List:  [facility] Note  Encounter Date: Jan 8, 2024  Office Visit      Dermatology Problem List:  1. Psoriasis    -triamcinolone 0.5% for elbows, fluocinolone 0.01% drops for ears/scalp , tacrolimus 0.1% ointment for face  2. Postinflammatory hyperpigmentation of left shin    -Hydroquinone BID    Social: Used to be   ____________________________________________    Assessment & Plan:  # Psoriasis. Elbows, ear canals, face, mark temporal scalp - improved with topicals, no joint pain   - Will continue triamcinolone 0.5% for her elbows.   - Will continue fluocinolone 0.01% drops for her ears and scalp   - Will continue tacrolimus  0.1% ointment for her face.    - Recommend avoiding products that lean towards anti-aging for now    - Recommend CeraVe or Cetaphil cleanser, as well as CeraVe moisturizer jar.   - edu on chronicity, autoimmune etiology    # Xerosis   - continue cerave cream for full body moisturizing    # Postinflammatory hyperpigmentation of left shin, improved since last visit compared to photo from 10/2/23   - Has improved since last visit on 10/2/23, she has not started the hydroquinone cream yet, but would like to now  - resent rx [facility] - hydroquinone 4% cream BID to the AA on the shin for up to 12 weeks - edu on risk of rebound hyperpigmentation    Procedures Performed:   None.    Follow-up: 6 month(s) in-person, or earlier for new or changing lesions    Staff and Scribe:    Scribe Disclosure:   I, TAY MARCUS, am serving as a scribe; to document services personally performed by Page Leong PA-C -based on data collection and the provider's statements to me.     Provider Disclosure:  I agree with above History, Review of Systems, Physical exam and Plan.  I have reviewed the content of the documentation and have edited it as needed. I have  personally performed the services documented here and the documentation accurately represents those services and the decisions I have made.      Electronically signed by:     All risks, benefits and alternatives were discussed with patient.  Patient is in agreement and understands the assessment and plan.  All questions were answered.    Page Leong PA-C, MPAS  [facility]: Phone: [phone], Fax: [phone]  [facility]: Phone: [phone],  Fax: [phone]  [facility]: Phone: [phone], Fax: [phone]  ____________________________________________    CC: Psoriasis (3 month psoriasis follow up, changed face wash and moisturizer, sx have improved, however now has sx in scalp )      Reviewed patients past medical history and pertinent chart review prior to patient's visit today.     HPI:  Ms. Joanie Multani is a 63 year old female who presents today as a return patient for 3 month follow upon xerosis and psoriasis.    Today, patient reported that she has changed her face wash and moisturizer to cetaphil and cerave.     Has noted that her symptoms have improved but has noticed some new scaly spots on her scalp. No joint pain. Has not started hydroquinone cream yet for shin, wants Rx resent.    Patient is otherwise feeling well, without additional concerns.    Labs:  N/A    Physical Exam:  Vitals: LMP 05/14/2014   SKIN: Focused examination of face, arms, hands, scalp was performed.   - There are erythematous well demarcated plaques with silvery micaceous scale on the elbows and external ear canals   - On the right shin, there is postinflammatory hyperpigmentation (brown patch). Has improved since last visit. Photo reviewed from 10/12/23  - Mild erythema on face, improved greatly since last visit  - mark temporal scalp - 1-2cm pink scaly patches just superior to both ears  - No other lesions of  concern on areas examined.     Medications:  Current Outpatient Medications   Medication    cetirizine-psuedoePHEDrine (ZYRTEC-D) 5-120 MG per tablet    fluocinolone (SYNALAR) 0.01 % solution    fluocinolone acetonide oil 0.01 % ear drops    hydroquinone (WAYNE) 4 % external cream    mometasone (NASONEX) 50 MCG/ACT nasal spray    tacrolimus (PROTOPIC) 0.1 % external ointment    triamcinolone (ARISTOCORT HP) 0.5 % external cream     No current facility-administered medications for this visit.      Past Medical/Surgical History:   Patient Active Problem List   Diagnosis    Allergic rhinitis    Chondromalacia of patella    Obesity    CARDIOVASCULAR SCREENING; LDL GOAL LESS THAN 160    Psoriasis    Seasonal allergic rhinitis     Past Medical History:   Diagnosis Date    Allergic rhinitis, cause unspecified     seasonal    Arthritis     left knee    Chondromalacia of patella     rt, saw ortho Dr. Siu had xray, MRI    Fracture closed of lower end of forearm 12/22/2008    skating    Obesity     Psoriasis 2/3/2014    Viral exanthem, unspecified     parvovirus, 3/04

## 2024-01-08 NOTE — LETTER
1/8/2024         RE: Joanie Multani  [address]  [place] [address]        Dear Colleague,    Thank you for referring your patient, Joanie Multani, to the [facility]. Please see a copy of my visit note below.    [facility] Note  Encounter Date: Jan 8, 2024  Office Visit      Dermatology Problem List:  [facility] Note  Encounter Date: Jan 8, 2024  Office Visit      Dermatology Problem List:  1. Psoriasis    -triamcinolone 0.5% for elbows, fluocinolone 0.01% drops for ears/scalp , tacrolimus 0.1% ointment for face  2. Postinflammatory hyperpigmentation of left shin    -Hydroquinone BID    Social: Used to be   ____________________________________________    Assessment & Plan:  # Psoriasis. Elbows, ear canals, face, mark temporal scalp - improved with topicals, no joint pain   - Will continue triamcinolone 0.5% for her elbows.   - Will continue fluocinolone 0.01% drops for her ears and scalp   - Will continue tacrolimus  0.1% ointment for her face.    - Recommend avoiding products that lean towards anti-aging for now    - Recommend CeraVe or Cetaphil cleanser, as well as CeraVe moisturizer jar.   - edu on chronicity, autoimmune etiology    # Xerosis   - continue cerave cream for full body moisturizing    # Postinflammatory hyperpigmentation of left shin, improved since last visit compared to photo from 10/2/23   - Has improved since last visit on 10/2/23, she has not started the hydroquinone cream yet, but would like to now  - resent rx to [facility] - hydroquinone 4% cream BID to the AA on the shin for up to 12 weeks - edu on risk of rebound hyperpigmentation    Procedures Performed:   None.    Follow-up: 6 month(s) in-person, or earlier for new or changing lesions    Staff and Scribe:    Scribe Disclosure:   I, TAY MARCUS, am serving as a scribe; to document services personally performed by Page  YEMI Leong -based on data collection and the provider's statements to me.     Provider Disclosure:  I agree with above History, Review of Systems, Physical exam and Plan.  I have reviewed the content of the documentation and have edited it as needed. I have personally performed the services documented here and the documentation accurately represents those services and the decisions I have made.      Electronically signed by:     All risks, benefits and alternatives were discussed with patient.  Patient is in agreement and understands the assessment and plan.  All questions were answered.    Page Leong PA-C, MPAS  [facility]: Phone: [phone], Fax: [phone]  [facility]: Phone: [phone],  Fax: [phone]  [facility]: Phone: [phone], Fax: [phone]  ____________________________________________    CC: Psoriasis (3 month psoriasis follow up, changed face wash and moisturizer, sx have improved, however now has sx in scalp )      Reviewed patients past medical history and pertinent chart review prior to patient's visit today.     HPI:  Ms. Joanie Multani is a 63 year old female who presents today as a return patient for 3 month follow upon xerosis and psoriasis.    Today, patient reported that she has changed her face wash and moisturizer to cetaphil and cerave.     Has noted that her symptoms have improved but has noticed some new scaly spots on her scalp. No joint pain. Has not started hydroquinone cream yet for shin, wants Rx resent.    Patient is otherwise feeling well, without additional concerns.    Labs:  N/A    Physical Exam:  Vitals: LMP 05/14/2014   SKIN: Focused examination of face, arms, hands, scalp was performed.   - There are erythematous well demarcated plaques with silvery micaceous scale on the elbows and external ear canals   - On the right shin, there is  postinflammatory hyperpigmentation (brown patch). Has improved since last visit. Photo reviewed from 10/12/23  - Mild erythema on face, improved greatly since last visit  - mark temporal scalp - 1-2cm pink scaly patches just superior to both ears  - No other lesions of concern on areas examined.     Medications:  Current Outpatient Medications   Medication     cetirizine-psuedoePHEDrine (ZYRTEC-D) 5-120 MG per tablet     fluocinolone (SYNALAR) 0.01 % solution     fluocinolone acetonide oil 0.01 % ear drops     hydroquinone (WAYNE) 4 % external cream     mometasone (NASONEX) 50 MCG/ACT nasal spray     tacrolimus (PROTOPIC) 0.1 % external ointment     triamcinolone (ARISTOCORT HP) 0.5 % external cream     No current facility-administered medications for this visit.      Past Medical/Surgical History:   Patient Active Problem List   Diagnosis     Allergic rhinitis     Chondromalacia of patella     Obesity     CARDIOVASCULAR SCREENING; LDL GOAL LESS THAN 160     Psoriasis     Seasonal allergic rhinitis     Past Medical History:   Diagnosis Date     Allergic rhinitis, cause unspecified     seasonal     Arthritis     left knee     Chondromalacia of patella     rt, saw ortho Dr. Siu had xray, MRI     Fracture closed of lower end of forearm 12/22/2008    skating     Obesity      Psoriasis 2/3/2014     Viral exanthem, unspecified     parvovirus, 3/04         Again, thank you for allowing me to participate in the care of your patient.        Sincerely,        Pgae Leong PA-C

## 2024-01-08 NOTE — PATIENT INSTRUCTIONS
Patient Education       Proper skin care from [facility]:    -Eliminate harsh soaps as they strip the natural oils from the skin, often resulting in dry itchy skin ( i.e. Dial, Zest, [place])  -Use mild soaps such as Cetaphil or Dove Sensitive Skin in the shower. You do not need to use soap on arms, legs, and trunk every time you shower unless visibly soiled.   -Avoid hot or cold showers.  -After showering, lightly dry off and apply moisturizing within 2-3 minutes. This will help trap moisture in the skin.   -Aggressive use of a moisturizer at least 1-2 times a day to the entire body (including -Vanicream, Cetaphil, Aquaphor or Cerave) and moisturize hands after every washing.  -We recommend using moisturizers that come in a tub that needs to be scooped out, not a pump. This has more of an oil base. It will hold moisture in your skin much better than a water base moisturizer. The above recommended are non-pore clogging.      Wear a sunscreen with at least SPF 30 on your face, ears, neck and V of the chest daily. Wear sunscreen on other areas of the body if those areas are exposed to the sun throughout the day. Sunscreens can contain physical and/or chemical blockers. Physical blockers are less likely to clog pores, these include zinc oxide and titanium dioxide. Reapply every two hour and after swimming.     Sunscreen examples: https://www.ewg.org/sunscreen/    UV radiation  UVA radiation remains constant throughout the day and throughout the year. It is a longer wavelength than UVB and therefore penetrates deeper into the skin leading to immediate and delayed tanning, photoaging, and skin cancer. 70-80% of UVA and UVB radiation occurs between the hours of 10am-2pm.  UVB radiation  UVB radiation causes the most harmful effects and is more significant during the summer months. However, snow and ice can reflect UVB radiation leading to skin damage during the winter months as well. UVB radiation is  responsible for tanning, burning, inflammation, delayed erythema (pinkness), pigmentation (brown spots), and skin cancer.     I recommend self monthly full body exams and yearly full body exams with a dermatology provider. If you develop a new or changing lesion please follow up for examination. Most skin cancers are pink and scaly or pink and pearly. However, we do see blue/brown/black skin cancers.  Consider the ABCDEs of melanoma when giving yourself your monthly full body exam ( don't forget the groin, buttocks, feet, toes, etc). A-asymmetry, B-borders, C-color, D-diameter, E-elevation or evolving. If you see any of these changes please follow up in clinic. If you cannot see your back I recommend purchasing a hand held mirror to use with a larger wall mirror.       Checking for Skin Cancer  You can find cancer early by checking your skin each month. There are 3 kinds of skin cancer. They are melanoma, basal cell carcinoma, and squamous cell carcinoma. Doing monthly skin checks is the best way to find new marks or skin changes. Follow the instructions below for checking your skin.   The ABCDEs of checking moles for melanoma   Check your moles or growths for signs of melanoma using ABCDE:   Asymmetry: the sides of the mole or growth don t match  Border: the edges are ragged, notched, or blurred  Color: the color within the mole or growth varies  Diameter: the mole or growth is larger than 6 mm (size of a pencil eraser)  Evolving: the size, shape, or color of the mole or growth is changing (evolving is not shown in the images below)    Checking for other types of skin cancer  Basal cell carcinoma or squamous cell carcinoma have symptoms such as:     A spot or mole that looks different from all other marks on your skin  Changes in how an area feels, such as itching, tenderness, or pain  Changes in the skin's surface, such as oozing, bleeding, or scaliness  A sore that does not heal  New swelling or redness beyond  the border of a mole    Who s at risk?  Anyone can get skin cancer. But you are at greater risk if you have:   Fair skin, light-colored hair, or light-colored eyes  Many moles or abnormal moles on your skin  A history of sunburns from sunlight or tanning beds  A family history of skin cancer  A history of exposure to radiation or chemicals  A weakened immune system  If you have had skin cancer in the past, you are at risk for recurring skin cancer.   How to check your skin  Do your monthly skin checkups in front of a full-length mirror. Check all parts of your body, including your:   Head (ears, face, neck, and scalp)  Torso (front, back, and sides)  Arms (tops, undersides, upper, and lower armpits)  Hands (palms, backs, and fingers, including under the nails)  Buttocks and genitals  Legs (front, back, and sides)  Feet (tops, soles, toes, including under the nails, and between toes)  If you have a lot of moles, take digital photos of them each month. Make sure to take photos both up close and from a distance. These can help you see if any moles change over time.   Most skin changes are not cancer. But if you see any changes in your skin, call your doctor right away. Only he or she can diagnose a problem. If you have skin cancer, seeing your doctor can be the first step toward getting the treatment that could save your life.   [employer] last reviewed this educational content on 4/1/2019 2000-2020 [employer]. [address] [place] [address], [place] [address]. All rights reserved. This information is not intended as a substitute for professional medical care. Always follow your healthcare professional's instructions.

## 2024-01-22 ENCOUNTER — OFFICE VISIT (OUTPATIENT)
Dept: FAMILY MEDICINE | Facility: CLINIC | Age: 64
End: 2024-01-22
Payer: COMMERCIAL

## 2024-01-22 VITALS
HEIGHT: 64 IN | RESPIRATION RATE: 16 BRPM | HEART RATE: 66 BPM | WEIGHT: 229 LBS | DIASTOLIC BLOOD PRESSURE: 80 MMHG | SYSTOLIC BLOOD PRESSURE: 126 MMHG | OXYGEN SATURATION: 96 % | TEMPERATURE: 97.6 F | BODY MASS INDEX: 39.09 KG/M2

## 2024-01-22 DIAGNOSIS — L40.9 PSORIASIS: ICD-10-CM

## 2024-01-22 DIAGNOSIS — Z78.9 LOW DENSITY LIPOPROTEIN (LDL) CHOLESTEROL LEVEL GREATER THAN OR EQUAL TO 100 MG/DL: ICD-10-CM

## 2024-01-22 DIAGNOSIS — Z00.00 ROUTINE GENERAL MEDICAL EXAMINATION AT A HEALTH CARE FACILITY: Primary | ICD-10-CM

## 2024-01-22 DIAGNOSIS — E66.01 CLASS 3 SEVERE OBESITY IN ADULT, UNSPECIFIED BMI, UNSPECIFIED OBESITY TYPE, UNSPECIFIED WHETHER SERIOUS COMORBIDITY PRESENT (H): ICD-10-CM

## 2024-01-22 DIAGNOSIS — Z12.31 ENCOUNTER FOR SCREENING MAMMOGRAM FOR BREAST CANCER: ICD-10-CM

## 2024-01-22 DIAGNOSIS — E66.813 CLASS 3 SEVERE OBESITY IN ADULT, UNSPECIFIED BMI, UNSPECIFIED OBESITY TYPE, UNSPECIFIED WHETHER SERIOUS COMORBIDITY PRESENT (H): ICD-10-CM

## 2024-01-22 DIAGNOSIS — J30.89 SEASONAL ALLERGIC RHINITIS DUE TO OTHER ALLERGIC TRIGGER: ICD-10-CM

## 2024-01-22 DIAGNOSIS — R19.7 DIARRHEA, UNSPECIFIED TYPE: ICD-10-CM

## 2024-01-22 LAB
ALBUMIN SERPL BCG-MCNC: 4.4 G/DL (ref 3.5–5.2)
ALP SERPL-CCNC: 77 U/L (ref 40–150)
ALT SERPL W P-5'-P-CCNC: 31 U/L (ref 0–50)
ANION GAP SERPL CALCULATED.3IONS-SCNC: 12 MMOL/L (ref 7–15)
AST SERPL W P-5'-P-CCNC: 26 U/L (ref 0–45)
BILIRUB SERPL-MCNC: 0.9 MG/DL
BUN SERPL-MCNC: 13.7 MG/DL (ref 8–23)
CALCIUM SERPL-MCNC: 9.8 MG/DL (ref 8.8–10.2)
CHLORIDE SERPL-SCNC: 107 MMOL/L (ref 98–107)
CHOLEST SERPL-MCNC: 223 MG/DL
CREAT SERPL-MCNC: 0.8 MG/DL (ref 0.51–0.95)
DEPRECATED HCO3 PLAS-SCNC: 24 MMOL/L (ref 22–29)
EGFRCR SERPLBLD CKD-EPI 2021: 83 ML/MIN/1.73M2
ERYTHROCYTE [DISTWIDTH] IN BLOOD BY AUTOMATED COUNT: 13.3 % (ref 10–15)
FASTING STATUS PATIENT QL REPORTED: NO
GLUCOSE SERPL-MCNC: 90 MG/DL (ref 70–99)
HBA1C MFR BLD: 5.5 % (ref 0–5.6)
HCT VFR BLD AUTO: 46.1 % (ref 35–47)
HDLC SERPL-MCNC: 63 MG/DL
HGB BLD-MCNC: 14.8 G/DL (ref 11.7–15.7)
LDLC SERPL CALC-MCNC: 136 MG/DL
MCH RBC QN AUTO: 28 PG (ref 26.5–33)
MCHC RBC AUTO-ENTMCNC: 32.1 G/DL (ref 31.5–36.5)
MCV RBC AUTO: 87 FL (ref 78–100)
NONHDLC SERPL-MCNC: 160 MG/DL
PLATELET # BLD AUTO: 308 10E3/UL (ref 150–450)
POTASSIUM SERPL-SCNC: 4.7 MMOL/L (ref 3.4–5.3)
PROT SERPL-MCNC: 7.1 G/DL (ref 6.4–8.3)
RBC # BLD AUTO: 5.28 10E6/UL (ref 3.8–5.2)
SODIUM SERPL-SCNC: 143 MMOL/L (ref 135–145)
TRIGL SERPL-MCNC: 121 MG/DL
TSH SERPL DL<=0.005 MIU/L-ACNC: 1.68 UIU/ML (ref 0.3–4.2)
VIT B12 SERPL-MCNC: 479 PG/ML (ref 232–1245)
VIT D+METAB SERPL-MCNC: 24 NG/ML (ref 20–50)
WBC # BLD AUTO: 4.2 10E3/UL (ref 4–11)

## 2024-01-22 PROCEDURE — 99396 PREV VISIT EST AGE 40-64: CPT | Performed by: INTERNAL MEDICINE

## 2024-01-22 PROCEDURE — 36415 COLL VENOUS BLD VENIPUNCTURE: CPT | Performed by: INTERNAL MEDICINE

## 2024-01-22 PROCEDURE — 80061 LIPID PANEL: CPT | Performed by: INTERNAL MEDICINE

## 2024-01-22 PROCEDURE — 83036 HEMOGLOBIN GLYCOSYLATED A1C: CPT | Performed by: INTERNAL MEDICINE

## 2024-01-22 PROCEDURE — 84443 ASSAY THYROID STIM HORMONE: CPT | Performed by: INTERNAL MEDICINE

## 2024-01-22 PROCEDURE — 82306 VITAMIN D 25 HYDROXY: CPT | Performed by: INTERNAL MEDICINE

## 2024-01-22 PROCEDURE — 85027 COMPLETE CBC AUTOMATED: CPT | Performed by: INTERNAL MEDICINE

## 2024-01-22 PROCEDURE — 99214 OFFICE O/P EST MOD 30 MIN: CPT | Mod: 25 | Performed by: INTERNAL MEDICINE

## 2024-01-22 PROCEDURE — 80053 COMPREHEN METABOLIC PANEL: CPT | Performed by: INTERNAL MEDICINE

## 2024-01-22 PROCEDURE — 87624 HPV HI-RISK TYP POOLED RSLT: CPT | Performed by: INTERNAL MEDICINE

## 2024-01-22 PROCEDURE — 86364 TISS TRNSGLTMNASE EA IG CLAS: CPT | Performed by: INTERNAL MEDICINE

## 2024-01-22 PROCEDURE — G0145 SCR C/V CYTO,THINLAYER,RESCR: HCPCS | Performed by: INTERNAL MEDICINE

## 2024-01-22 PROCEDURE — 82607 VITAMIN B-12: CPT | Performed by: INTERNAL MEDICINE

## 2024-01-22 RX ORDER — FLUTICASONE PROPIONATE 50 MCG
1 SPRAY, SUSPENSION (ML) NASAL DAILY
Qty: 16 G | Refills: 3 | Status: SHIPPED | OUTPATIENT
Start: 2024-01-22

## 2024-01-22 ASSESSMENT — ENCOUNTER SYMPTOMS
NAUSEA: 0
COUGH: 0
DIZZINESS: 0
ABDOMINAL PAIN: 0
JOINT SWELLING: 0
CHILLS: 0
DYSURIA: 0
HEARTBURN: 0
PALPITATIONS: 0
FEVER: 0
PARESTHESIAS: 0
EYE PAIN: 0
NERVOUS/ANXIOUS: 0
HEMATURIA: 0
SORE THROAT: 0
ARTHRALGIAS: 0
SHORTNESS OF BREATH: 0
CONSTIPATION: 0
BREAST MASS: 0
MYALGIAS: 0
HEMATOCHEZIA: 0
HEADACHES: 0
WEAKNESS: 0
DIARRHEA: 0
FREQUENCY: 0

## 2024-01-22 ASSESSMENT — PAIN SCALES - GENERAL: PAINLEVEL: NO PAIN (0)

## 2024-01-22 NOTE — PROGRESS NOTES
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  Her diet may be off but she plans to go on a diet  She has lost weight in the past      She has nasal allergies    Healthy Habits:     Getting at least 3 servings of Calcium per day:  Yes    Bi-annual eye exam:  Yes    Dental care twice a year:  Yes    Sleep apnea or symptoms of sleep apnea:  None    Diet:  Regular (no restrictions)    Frequency of exercise:  None    Taking medications regularly:  Yes    Barriers to taking medications:  None    Medication side effects:  None    Additional concerns today:  No  Answers submitted by the patient for this visit:  Annual Preventive Visit (Submitted on 1/22/2024)  Chief Complaint: Annual Exam:  Blood in stool: No  heartburn: No  peripheral edema: No  mood changes: No  Skin sensation changes: No  tenderness: No  breast mass: No  breast discharge: No                    Social History     Tobacco Use    Smoking status: Never    Smokeless tobacco: Never   Substance Use Topics    Alcohol use: Yes     Comment: 3-5 drinks/week             1/22/2024    11:06 AM   Alcohol Use   Prescreen: >3 drinks/day or >7 drinks/week? No          No data to display              Reviewed orders with patient.  Reviewed health maintenance and updated orders accordingly -   BP Readings from Last 3 Encounters:   01/22/24 126/80   02/10/20 109/73   08/24/18 138/90    Wt Readings from Last 3 Encounters:   01/22/24 103.9 kg (229 lb)   02/10/20 84.8 kg (187 lb)   08/24/18 96.6 kg (213  lb)                  Patient Active Problem List   Diagnosis    Allergic rhinitis    Chondromalacia of patella    Obesity    CARDIOVASCULAR SCREENING; LDL GOAL LESS THAN 160    Psoriasis    Seasonal allergic rhinitis    Diarrhea, unspecified type     Past Surgical History:   Procedure Laterality Date    C DEXA,BONE DENSITY,APPENDICULR SKELTN  2009    nl bmd    COLONOSCOPY      nl    ZZC NONSPECIFIC PROCEDURE      Tonsillectomy    ZZC NONSPECIFIC PROCEDURE       [id number]    ZZC NONSPECIFIC PROCEDURE  3/08    lt radial closed fracture        Social History     Tobacco Use    Smoking status: Never    Smokeless tobacco: Never   Substance Use Topics    Alcohol use: Yes     Comment: 3-5 drinks/week     Family History   Problem Relation Age of Onset    Depression Father     Diabetes Maternal Grandmother     Hypertension Maternal Grandfather     Diabetes Maternal Grandfather     Circulatory Maternal Grandfather         bleeding prob    Diabetes Paternal Grandfather     Depression Daughter     Depression Brother     Depression Sister          Current Outpatient Medications   Medication Sig Dispense Refill    cetirizine-psuedoePHEDrine (ZYRTEC-D) 5-120 MG per tablet Take 1 tablet by mouth daily (Patient taking differently: Take 1 tablet by mouth daily as needed) 60 tablet 2    fluocinolone (SYNALAR) 0.01 % solution Please re issue in drop formula rather than squeeze bottle 30 mL 1    fluocinolone acetonide oil 0.01 % ear drops INSTILL 5 DROPS INTO THE AFFECTED EAR TWICE DAILY 20 mL 4    fluticasone (FLONASE) 50 MCG/ACT nasal spray Spray 1 spray into both nostrils daily 16 g 3    hydroquinone (WAYNE) 4 % external cream Apply topically BID to the R lower leg for 12 weeks. 28.35 g 1    liraglutide - Weight Management (SAXENDA) 18 MG/3ML pen Inject 0.6 mg Subcutaneous daily for 7 days, THEN 1.2 mg daily for 7 days, THEN 1.8 mg daily for 7 days, THEN 2.4 mg daily for 7 days, THEN 3 mg daily for 62 days. 38 mL 0     mometasone (NASONEX) 50 MCG/ACT nasal spray Spray 2 sprays into both nostrils daily (Patient taking differently: Spray 2 sprays into both nostrils daily as needed) 1 Box 1    tacrolimus (PROTOPIC) 0.1 % external ointment Apply topically to the face BID 60 g 4    triamcinolone (ARISTOCORT HP) 0.5 % external cream Apply sparingly to affected area three times daily. 60 g 3       Breast Cancer Screening:  Any new diagnosis of family breast, ovarian, or bowel cancer? no    FHS-7:        No data to display                Mammogram Screening - Offered annual screening and updated Health Maintenance for [place] plan based on risk factor consideration  Pertinent mammograms are reviewed under the imaging tab.    History of abnormal Pap smear: NO - age 30-65 PAP every 5 years with negative HPV co-testing recommended      Latest Ref Rng & Units 2/10/2020     9:30 AM 2/10/2020     9:26 AM 6/3/2016     9:29 AM   PAP / HPV   PAP (Historical)   NIL     HPV 16 DNA NEG^Negative Negative   Negative    HPV 18 DNA NEG^Negative Negative   Negative    Other HR HPV NEG^Negative Negative   Negative      Reviewed and updated as needed this visit by clinical staff   Tobacco  Allergies  Meds      Soc Hx        Reviewed and updated as needed this visit by Provider     Meds              Past Medical History:   Diagnosis Date    Allergic rhinitis, cause unspecified     seasonal    Arthritis     left knee    Chondromalacia of patella     rt, saw ortho Dr. Siu had xray, MRI    Fracture closed of lower end of forearm 2008    skating    Obesity     Psoriasis 2/3/2014    Viral exanthem, unspecified     parvovirus, 3/04      Past Surgical History:   Procedure Laterality Date    C DEXA,BONE DENSITY,APPENDICULR SKELTN      nl bmd    COLONOSCOPY      nl    ZZC NONSPECIFIC PROCEDURE      Tonsillectomy    [facility] NONSPECIFIC PROCEDURE       nsvdx3    [facility] NONSPECIFIC PROCEDURE  3/08    lt radial closed fracture      Review of Systems  "  Constitutional:  Negative for chills and fever.   HENT:  Negative for congestion, ear pain, hearing loss and sore throat.    Eyes:  Negative for pain and visual disturbance.   Respiratory:  Negative for cough and shortness of breath.    Cardiovascular:  Negative for chest pain and palpitations.   Gastrointestinal:  Negative for abdominal pain, constipation, diarrhea and nausea.   Genitourinary:  Negative for dysuria, frequency, genital sores, hematuria, pelvic pain, urgency, vaginal bleeding and vaginal discharge.   Musculoskeletal:  Negative for arthralgias, joint swelling and myalgias.   Skin:  Negative for rash.   Neurological:  Negative for dizziness, weakness and headaches.   Psychiatric/Behavioral:  The patient is not nervous/anxious.          OBJECTIVE:   /80 (BP Location: Left arm, Patient Position: Chair, Cuff Size: Adult Large)   Pulse 66   Temp 97.6  F (36.4  C) (Temporal)   Resp 16   Ht 1.623 m (5' 3.9\")   Wt 103.9 kg (229 lb)   LMP 05/14/2014   SpO2 96%   BMI 39.43 kg/m     Estimated body mass index is 39.43 kg/m  as calculated from the following:    Height as of this encounter: 1.623 m (5' 3.9\").    Weight as of this encounter: 103.9 kg (229 lb).  Physical Exam  GENERAL: alert and no distress  EYES: Eyes grossly normal to inspection, PERRL and conjunctivae and sclerae normal  HENT: ear canals and TM's normal, nose and mouth without ulcers or lesions  NECK: no adenopathy, no asymmetry, masses, or scars  RESP: lungs clear to auscultation - no rales, rhonchi or wheezes  CV: regular rate and rhythm, normal S1 S2, no S3 or S4, no murmur, click or rub, no peripheral edema  ABDOMEN: soft, nontender, no hepatosplenomegaly, no masses and bowel sounds normal   (female): normal female external genitalia, normal urethral meatus, vaginal mucosa pink, moist, well rugated  MS: no gross musculoskeletal defects noted, no edema  SKIN: Psoriasis on the elbows no suspicious lesions or rashes  NEURO: " Normal strength and tone, mentation intact and speech normal  PSYCH: mentation appears normal, affect normal/bright  LYMPH: no cervical, supraclavicular, axillary, or inguinal adenopathy        ASSESSMENT/PLAN:   Very pleasant 63 years old who is here for a physical examination and she will do her annual mammogram  She will consider RSV vaccine  We discussed about diet and exercise  - Pap screen with HPV - recommended age 30 - 65 years    Class 3 severe obesity in adult, unspecified BMI, unspecified obesity type, unspecified whether serious comorbidity present (H)  We discussed about treatment options and Saxenda is covered by her insurance  We will teach her how to do injections and she will also do the nutrition consult and a diet  It may make her GI symptoms worsen we will have to be informed when that happens  - CBC with Platelets    - Comprehensive metabolic panel  - REVIEW OF HEALTH MAINTENANCE PROTOCOL ORDERS  - TSH with free T4 reflex  - liraglutide - Weight Management (SAXENDA) 18 MG/3ML pen  Dispense: 38 mL; Refill: 0  - Med Therapy Management Referral  - Vitamin B12  - Vitamin D Deficiency  - Vitamin D Deficiency  - Vitamin B12  - Nutrition Referral  - Hemoglobin A1c  - Hemoglobin A1c    Psoriasis  Topical ointments and eardrops  - REVIEW OF HEALTH MAINTENANCE PROTOCOL ORDERS  - TSH with free T4 reflex    Low density lipoprotein (LDL) cholesterol level greater than or equal to 100 mg/dl    - Comprehensive metabolic panel  - Lipid panel reflex to direct LDL Fasting  - REVIEW OF HEALTH MAINTENANCE PROTOCOL ORDERS  - TSH with free T4 reflex  - Nutrition Referral    Seasonal allergic rhinitis due to other allergic trigger  We will try Flonase  - fluticasone (FLONASE) 50 MCG/ACT nasal spray  Dispense: 16 g; Refill: 3    Diarrhea, unspecified type  He might need colonoscopy and we will obtain previous colonoscopy done at [facility]  - Tissue transglutaminase brodie IgA and IgG    Encounter for screening mammogram  for breast cancer    - MA Screening Digital Bilateral            Counseling  Reviewed preventive health counseling, as reflected in patient instructions       Regular exercise       Healthy diet/nutrition        She reports that she has never smoked. She has never used smokeless tobacco.        Signed Electronically by: Elsa Wellington MD

## 2024-01-23 ENCOUNTER — HOSPITAL ENCOUNTER (OUTPATIENT)
Dept: MAMMOGRAPHY | Facility: CLINIC | Age: 64
Discharge: HOME | End: 2024-01-23
Attending: INTERNAL MEDICINE | Admitting: INTERNAL MEDICINE
Payer: COMMERCIAL

## 2024-01-23 DIAGNOSIS — Z12.31 ENCOUNTER FOR SCREENING MAMMOGRAM FOR BREAST CANCER: ICD-10-CM

## 2024-01-23 LAB
TTG IGA SER-ACNC: 0.5 U/ML
TTG IGG SER-ACNC: 0.6 U/ML

## 2024-01-23 PROCEDURE — 77063 BREAST TOMOSYNTHESIS BI: CPT

## 2024-01-25 ENCOUNTER — TELEPHONE (OUTPATIENT)
Dept: FAMILY MEDICINE | Facility: CLINIC | Age: 64
End: 2024-01-25

## 2024-01-25 ENCOUNTER — TELEPHONE (OUTPATIENT)
Dept: FAMILY MEDICINE | Facility: CLINIC | Age: 64
End: 2024-01-25
Payer: COMMERCIAL

## 2024-01-25 DIAGNOSIS — E66.813 CLASS 3 SEVERE OBESITY IN ADULT, UNSPECIFIED BMI, UNSPECIFIED OBESITY TYPE, UNSPECIFIED WHETHER SERIOUS COMORBIDITY PRESENT (H): Primary | ICD-10-CM

## 2024-01-25 DIAGNOSIS — E66.01 CLASS 3 SEVERE OBESITY IN ADULT, UNSPECIFIED BMI, UNSPECIFIED OBESITY TYPE, UNSPECIFIED WHETHER SERIOUS COMORBIDITY PRESENT (H): Primary | ICD-10-CM

## 2024-01-25 LAB
BKR LAB AP GYN ADEQUACY: NORMAL
BKR LAB AP GYN INTERPRETATION: NORMAL
BKR LAB AP HPV REFLEX: NORMAL
BKR LAB AP PREVIOUS ABNORMAL: NORMAL
PATH REPORT.COMMENTS IMP SPEC: NORMAL
PATH REPORT.COMMENTS IMP SPEC: NORMAL
PATH REPORT.RELEVANT HX SPEC: NORMAL

## 2024-01-25 NOTE — TELEPHONE ENCOUNTER
Reason for Call:  Appointment Request    Patient requesting this type of appt: Chronic Diease Management/Medication/Follow-Up    Requested provider: Elsa Wellington    Reason patient unable to be scheduled: Not within requested timeframe    When does patient want to be seen/preferred time:  Around end of July (6 month follow up)    Comments: Schedule is closed for PCP    Could we send this information to you in [facility] or would you prefer to receive a phone call?:   Patient would prefer a phone call   Okay to leave a detailed message?: Yes at Cell number on file:    Telephone Information:   Mobile [phone]       Call taken on 1/25/2024 at 11:51 AM by Lavern Galloway

## 2024-01-26 NOTE — TELEPHONE ENCOUNTER
Prior Authorization Retail Medication Request    Medication/Dose: Saxenda 18 mg/3mL  Diagnosis and ICD code (if different than what is on RX):  E66.01  New/renewal/insurance change PA/secondary ins. PA:  Previously Tried and Failed:  None  Rationale:  Patient with BMI of 39.43 that would benefit from weight loss medication with starting on new diet    Insurance   Primary: [facility]  Insurance ID:  [id number]    Secondary (if applicable):  Insurance ID:      Pharmacy Information (if different than what is on RX)  Name:  [id number]  Phone:  [phone]  Fax:[phone]

## 2024-01-29 LAB
HUMAN PAPILLOMA VIRUS 16 DNA: NEGATIVE
HUMAN PAPILLOMA VIRUS 18 DNA: NEGATIVE
HUMAN PAPILLOMA VIRUS FINAL DIAGNOSIS: NORMAL
HUMAN PAPILLOMA VIRUS OTHER HR: NEGATIVE

## 2024-01-31 ENCOUNTER — TELEPHONE (OUTPATIENT)
Dept: FAMILY MEDICINE | Facility: CLINIC | Age: 64
End: 2024-01-31
Payer: COMMERCIAL

## 2024-01-31 NOTE — TELEPHONE ENCOUNTER
MTM referral from: [facility] visit (referral by provider)    MTM referral outreach attempt #2 on January 31, 2024 at 10:51 AM      Outcome: Patient not reachable after several attempts, will route to MTM Pharmacist/Provider as an FYI.  MT scheduling number is .  Thank you for the referral.    Use vbc for the carrier/Plan on the flowsheet      Moov cc.t Message Sent    Dakotah Silverio  [facility] [place]

## 2024-02-01 NOTE — TELEPHONE ENCOUNTER
Pt scheduled for 07.22.24    Result notes from 01.23.24 indicate Pt needs A1C draw. Dr. Wellington, are you wanting Pt to do that at monik't with you on 07.22.24 or beforehand? If before, kindly place orders and route back to TC's to get scheduled. Thanks!

## 2024-02-02 NOTE — TELEPHONE ENCOUNTER
A1C's are done in-house. Called cell and LVM to have Pt come a little earlier for her monik't with Dr. Wellington 07.22.24 and do lab draw first. Did request a callback to confirm

## 2024-02-06 NOTE — TELEPHONE ENCOUNTER
Central Prior Authorization Team   Phone: [phone]    PA Initiation    Medication: Saxenda 18 mg/3mL  Insurance Company: [facility] - Phone [phone] Fax [phone]  Pharmacy Filling the Rx: [id number] - [place], [place] - [address]  Filling Pharmacy Phone: [phone]  Filling Pharmacy Fax:    Start Date: 2/6/2024

## 2024-02-07 NOTE — TELEPHONE ENCOUNTER
PRIOR AUTHORIZATION DENIED    Medication: Saxenda 18 mg/3mL-PA DENIED     Denial Date: 2/7/2024    Denial Rational: Medication Exclusion from patients benefit plan.               Appeal Information:

## 2024-02-15 ENCOUNTER — OFFICE VISIT (OUTPATIENT)
Dept: FAMILY MEDICINE | Facility: CLINIC | Age: 64
End: 2024-02-15
Attending: PHYSICIAN ASSISTANT
Payer: COMMERCIAL

## 2024-02-15 DIAGNOSIS — L82.1 SK (SEBORRHEIC KERATOSIS): ICD-10-CM

## 2024-02-15 DIAGNOSIS — D18.01 CHERRY ANGIOMA: ICD-10-CM

## 2024-02-15 DIAGNOSIS — L57.8 PHOTOAGING OF SKIN: ICD-10-CM

## 2024-02-15 DIAGNOSIS — L81.4 SOLAR LENTIGO: ICD-10-CM

## 2024-02-15 DIAGNOSIS — D22.9 MULTIPLE BENIGN NEVI: Primary | ICD-10-CM

## 2024-02-15 PROCEDURE — 99213 OFFICE O/P EST LOW 20 MIN: CPT | Performed by: PHYSICIAN ASSISTANT

## 2024-02-15 ASSESSMENT — PAIN SCALES - GENERAL: PAINLEVEL: NO PAIN (0)

## 2024-02-15 NOTE — LETTER
2/15/2024         RE: Joanie Multani  [address]  [place] [address]        Dear Colleague,    Thank you for referring your patient, Joanie Multani, to the [facility]. Please see a copy of my visit note below.    [facility] Dermatology Note  Encounter Date: Feb 15, 2024  Office Visit      Dermatology Problem List:  Last FBSC performed on 2/15/24    1. Psoriasis    -triamcinolone 0.5% for elbows, fluocinolone 0.01% drops for ears/scalp , tacrolimus 0.1% ointment for face  2. Postinflammatory hyperpigmentation of left shin    -Hydroquinone BID     Social: Used to be   ____________________________________________    Assessment & Plan:  # Benign findings: multiple benign nevi, lentigines, cherry angiomas, SKs  - edu on benign etiology  - Signs and Symptoms of non-melanoma skin cancer and ABCDEs of melanoma reviewed with patient. Patient encouraged to perform monthly self skin exams and educated on how to perform them. UV precautions reviewed with patient. Patient was asked about new or changing moles/lesions on body.   - Sunscreen: Apply 20 minutes prior to going outdoors and reapply every two hours, when wet or sweating. We recommend using an SPF 30 or higher, and to use one that is water resistant.     - RTC for changes    # Photoaging  - discussed briefly need for strict sun protection  - recommended skin ceuticals  - CE Ferulic  - Vit C serum, OTC retinol (prior to rx to make sure she can tolerate)  - PDL/IPL for lentigines  - continue cerave    Procedures Performed:   None     Follow-up: 1 year(s) in-person, or earlier for new or changing lesions    Staff and scribe     Scribe Disclosure:   I, TAY MARCUS, am serving as a scribe; to document services personally performed by Page Leong PA-C -based on data collection and the provider's statements to me.     Provider Disclosure:  I agree with above History, Review of Systems, Physical  exam and Plan.  I have reviewed the content of the documentation and have edited it as needed. I have personally performed the services documented here and the documentation accurately represents those services and the decisions I have made.      Electronically signed by:     All risks, benefits and alternatives were discussed with patient.  Patient is in agreement and understands the assessment and plan.  All questions were answered.    Page Leong PA-C, MPAS  [facility]: Phone: [phone], Fax: [phone]  [facility]: Phone: [phone],  Fax: [phone]  [facility]: Phone: [phone], Fax: [phone]  ____________________________________________    CC: Skin Check (FBSC)      Reviewed patients past medical history and pertinent chart review prior to patient's visit today.     HPI:  Ms. Joanie Multani is a 63 year old female who presents today as a return patient for FBSC. Would also like to discuss skin care routine/anti aging.    Patient is otherwise feeling well, without additional concerns.    Labs:  N/A    Physical Exam:  Vitals: LMP 05/14/2014   SKIN: Full skin, which includes the head/face, both arms, chest, back, abdomen,both legs, genitalia and/or groin buttocks, digits and/or nails, was examined.   -  Hallman's skin type II, has <100 nevi  - There are dome shaped bright red papules on the trunk.   - Multiple regular brown pigmented macules and papules are identified on the trunk and extremities.   - Scattered brown macules on sun exposed areas.  - There are waxy stuck on tan to brown papules on the trunk.   - No other lesions of concern on areas examined.     Medications:  Current Outpatient Medications   Medication     cetirizine-psuedoePHEDrine (ZYRTEC-D) 5-120 MG per tablet     fluocinolone (SYNALAR) 0.01 % solution     fluocinolone acetonide oil 0.01 % ear drops      fluticasone (FLONASE) 50 MCG/ACT nasal spray     hydroquinone (WAYNE) 4 % external cream     mometasone (NASONEX) 50 MCG/ACT nasal spray     tacrolimus (PROTOPIC) 0.1 % external ointment     triamcinolone (ARISTOCORT HP) 0.5 % external cream     liraglutide - Weight Management (SAXENDA) 18 MG/3ML pen     No current facility-administered medications for this visit.      Past Medical/Surgical History:   Patient Active Problem List   Diagnosis     Allergic rhinitis     Chondromalacia of patella     Obesity     CARDIOVASCULAR SCREENING; LDL GOAL LESS THAN 160     Psoriasis     Seasonal allergic rhinitis     Diarrhea, unspecified type     Past Medical History:   Diagnosis Date     Allergic rhinitis, cause unspecified     seasonal     Arthritis     left knee     Chondromalacia of patella     rt, saw ortho Dr. Siu had xray, MRI     Fracture closed of lower end of forearm 12/22/2008    skating     Obesity      Psoriasis 2/3/2014     Viral exanthem, unspecified     parvovirus, 3/04                        Again, thank you for allowing me to participate in the care of your patient.        Sincerely,        Page Leong PA-C

## 2024-02-15 NOTE — PATIENT INSTRUCTIONS
Patient Education       Proper skin care from [facility]:    -Eliminate harsh soaps as they strip the natural oils from the skin, often resulting in dry itchy skin ( i.e. Dial, Zest, [place])  -Use mild soaps such as Cetaphil or Dove Sensitive Skin in the shower. You do not need to use soap on arms, legs, and trunk every time you shower unless visibly soiled.   -Avoid hot or cold showers.  -After showering, lightly dry off and apply moisturizing within 2-3 minutes. This will help trap moisture in the skin.   -Aggressive use of a moisturizer at least 1-2 times a day to the entire body (including -Vanicream, Cetaphil, Aquaphor or Cerave) and moisturize hands after every washing.  -We recommend using moisturizers that come in a tub that needs to be scooped out, not a pump. This has more of an oil base. It will hold moisture in your skin much better than a water base moisturizer. The above recommended are non-pore clogging.      Wear a sunscreen with at least SPF 30 on your face, ears, neck and V of the chest daily. Wear sunscreen on other areas of the body if those areas are exposed to the sun throughout the day. Sunscreens can contain physical and/or chemical blockers. Physical blockers are less likely to clog pores, these include zinc oxide and titanium dioxide. Reapply every two hour and after swimming.     Sunscreen examples: https://www.ewg.org/sunscreen/    UV radiation  UVA radiation remains constant throughout the day and throughout the year. It is a longer wavelength than UVB and therefore penetrates deeper into the skin leading to immediate and delayed tanning, photoaging, and skin cancer. 70-80% of UVA and UVB radiation occurs between the hours of 10am-2pm.  UVB radiation  UVB radiation causes the most harmful effects and is more significant during the summer months. However, snow and ice can reflect UVB radiation leading to skin damage during the winter months as well. UVB radiation is  responsible for tanning, burning, inflammation, delayed erythema (pinkness), pigmentation (brown spots), and skin cancer.     I recommend self monthly full body exams and yearly full body exams with a dermatology provider. If you develop a new or changing lesion please follow up for examination. Most skin cancers are pink and scaly or pink and pearly. However, we do see blue/brown/black skin cancers.  Consider the ABCDEs of melanoma when giving yourself your monthly full body exam ( don't forget the groin, buttocks, feet, toes, etc). A-asymmetry, B-borders, C-color, D-diameter, E-elevation or evolving. If you see any of these changes please follow up in clinic. If you cannot see your back I recommend purchasing a hand held mirror to use with a larger wall mirror.       Checking for Skin Cancer  You can find cancer early by checking your skin each month. There are 3 kinds of skin cancer. They are melanoma, basal cell carcinoma, and squamous cell carcinoma. Doing monthly skin checks is the best way to find new marks or skin changes. Follow the instructions below for checking your skin.   The ABCDEs of checking moles for melanoma   Check your moles or growths for signs of melanoma using ABCDE:   Asymmetry: the sides of the mole or growth don t match  Border: the edges are ragged, notched, or blurred  Color: the color within the mole or growth varies  Diameter: the mole or growth is larger than 6 mm (size of a pencil eraser)  Evolving: the size, shape, or color of the mole or growth is changing (evolving is not shown in the images below)    Checking for other types of skin cancer  Basal cell carcinoma or squamous cell carcinoma have symptoms such as:     A spot or mole that looks different from all other marks on your skin  Changes in how an area feels, such as itching, tenderness, or pain  Changes in the skin's surface, such as oozing, bleeding, or scaliness  A sore that does not heal  New swelling or redness beyond  the border of a mole    Who s at risk?  Anyone can get skin cancer. But you are at greater risk if you have:   Fair skin, light-colored hair, or light-colored eyes  Many moles or abnormal moles on your skin  A history of sunburns from sunlight or tanning beds  A family history of skin cancer  A history of exposure to radiation or chemicals  A weakened immune system  If you have had skin cancer in the past, you are at risk for recurring skin cancer.   How to check your skin  Do your monthly skin checkups in front of a full-length mirror. Check all parts of your body, including your:   Head (ears, face, neck, and scalp)  Torso (front, back, and sides)  Arms (tops, undersides, upper, and lower armpits)  Hands (palms, backs, and fingers, including under the nails)  Buttocks and genitals  Legs (front, back, and sides)  Feet (tops, soles, toes, including under the nails, and between toes)  If you have a lot of moles, take digital photos of them each month. Make sure to take photos both up close and from a distance. These can help you see if any moles change over time.   Most skin changes are not cancer. But if you see any changes in your skin, call your doctor right away. Only he or she can diagnose a problem. If you have skin cancer, seeing your doctor can be the first step toward getting the treatment that could save your life.   [employer] last reviewed this educational content on 4/1/2019 2000-2020 [employer]. [address] [place] [address], [place] [address]. All rights reserved. This information is not intended as a substitute for professional medical care. Always follow your healthcare professional's instructions.       When should I call my doctor?  If you are worsening or not improving, please, contact us or seek urgent care as noted below.     Who should I call with questions (adults)?  [facility] (adult and pediatric): [phone]  [facility]  [place] (adult): [phone]  [facility] ([place], [place], [place] and [place]) [phone]  For urgent needs outside of business hours call the [facility] at [phone] and ask for the dermatology resident on call to be paged  If this is a medical emergency and you are unable to reach an ER, Call 911      If you need a prescription refill, please contact your pharmacy. Refills are approved or denied by our Physicians during normal business hours, Monday through Fridays  Per office policy, refills will not be granted if you have not been seen within the past year (or sooner depending on your child's condition)      Recommended Sunscreens:

## 2024-02-15 NOTE — PROGRESS NOTES
[facility] Dermatology Note  Encounter Date: Feb 15, 2024  Office Visit      Dermatology Problem List:  Last FBSC performed on 2/15/24    1. Psoriasis    -triamcinolone 0.5% for elbows, fluocinolone 0.01% drops for ears/scalp , tacrolimus 0.1% ointment for face  2. Postinflammatory hyperpigmentation of left shin    -Hydroquinone BID     Social: Used to be   ____________________________________________    Assessment & Plan:  # Benign findings: multiple benign nevi, lentigines, cherry angiomas, SKs  - edu on benign etiology  - Signs and Symptoms of non-melanoma skin cancer and ABCDEs of melanoma reviewed with patient. Patient encouraged to perform monthly self skin exams and educated on how to perform them. UV precautions reviewed with patient. Patient was asked about new or changing moles/lesions on body.   - Sunscreen: Apply 20 minutes prior to going outdoors and reapply every two hours, when wet or sweating. We recommend using an SPF 30 or higher, and to use one that is water resistant.     - RTC for changes    # Photoaging  - discussed briefly need for strict sun protection  - recommended skin ceuticals  - CE Ferulic  - Vit C serum, OTC retinol (prior to rx to make sure she can tolerate)  - PDL/IPL for lentigines  - continue cerave    Procedures Performed:   None     Follow-up: 1 year(s) in-person, or earlier for new or changing lesions    Staff and scribe     Scribe Disclosure:   I, TAY MARCUS, am serving as a scribe; to document services personally performed by Page Leong PA-C -based on data collection and the provider's statements to me.     Provider Disclosure:  I agree with above History, Review of Systems, Physical exam and Plan.  I have reviewed the content of the documentation and have edited it as needed. I have personally performed the services documented here and the documentation accurately represents those services and the decisions I have made.       Electronically signed by:     All risks, benefits and alternatives were discussed with patient.  Patient is in agreement and understands the assessment and plan.  All questions were answered.    Page Leong PA-C, MPAS  [facility] [place]: Phone: [phone], Fax: [phone]  [facility]: Phone: [phone],  Fax: [phone]  [facility]: Phone: [phone], Fax: [phone]  ____________________________________________    CC: Skin Check (FBSC)      Reviewed patients past medical history and pertinent chart review prior to patient's visit today.     HPI:  Ms. Joanie Multani is a 63 year old female who presents today as a return patient for FBSC. Would also like to discuss skin care routine/anti aging.    Patient is otherwise feeling well, without additional concerns.    Labs:  N/A    Physical Exam:  Vitals: LMP 05/14/2014   SKIN: Full skin, which includes the head/face, both arms, chest, back, abdomen,both legs, genitalia and/or groin buttocks, digits and/or nails, was examined.   -  Hallman's skin type II, has <100 nevi  - There are dome shaped bright red papules on the trunk.   - Multiple regular brown pigmented macules and papules are identified on the trunk and extremities.   - Scattered brown macules on sun exposed areas.  - There are waxy stuck on tan to brown papules on the trunk.   - No other lesions of concern on areas examined.     Medications:  Current Outpatient Medications   Medication    cetirizine-psuedoePHEDrine (ZYRTEC-D) 5-120 MG per tablet    fluocinolone (SYNALAR) 0.01 % solution    fluocinolone acetonide oil 0.01 % ear drops    fluticasone (FLONASE) 50 MCG/ACT nasal spray    hydroquinone (WAYNE) 4 % external cream    mometasone (NASONEX) 50 MCG/ACT nasal spray    tacrolimus (PROTOPIC) 0.1 % external ointment    triamcinolone (ARISTOCORT HP) 0.5 % external cream     liraglutide - Weight Management (SAXENDA) 18 MG/3ML pen     No current facility-administered medications for this visit.      Past Medical/Surgical History:   Patient Active Problem List   Diagnosis    Allergic rhinitis    Chondromalacia of patella    Obesity    CARDIOVASCULAR SCREENING; LDL GOAL LESS THAN 160    Psoriasis    Seasonal allergic rhinitis    Diarrhea, unspecified type     Past Medical History:   Diagnosis Date    Allergic rhinitis, cause unspecified     seasonal    Arthritis     left knee    Chondromalacia of patella     rt, saw ortho Dr. Siu had xray, MRI    Fracture closed of lower end of forearm 12/22/2008    skating    Obesity     Psoriasis 2/3/2014    Viral exanthem, unspecified     parvovirus, 3/04

## 2024-10-08 ENCOUNTER — MYC MEDICAL ADVICE (OUTPATIENT)
Dept: FAMILY MEDICINE | Facility: CLINIC | Age: 64
End: 2024-10-08
Payer: COMMERCIAL

## 2024-10-17 ENCOUNTER — NURSE TRIAGE (OUTPATIENT)
Dept: FAMILY MEDICINE | Facility: CLINIC | Age: 64
End: 2024-10-17

## 2024-10-17 ENCOUNTER — MYC MEDICAL ADVICE (OUTPATIENT)
Dept: FAMILY MEDICINE | Facility: CLINIC | Age: 64
End: 2024-10-17

## 2024-10-17 NOTE — TELEPHONE ENCOUNTER
Shaji Wellington    -Since 9/2/24, patient has ongoing dry/pro cough, some nasal drainage.   -Mami clark has been helpful  -Asking for your feedback. Declines to schedule appointment    Reference:    Reason for Disposition   Patient wants to be seen    Additional Information   Negative: Bluish (or gray) lips or face   Negative: SEVERE difficulty breathing (e.g., struggling for each breath, speaks in single words)   Negative: Rapid onset of cough and has hives   Negative: Coughing started suddenly after medicine, an allergic food or bee sting   Negative: Difficulty breathing after exposure to flames, smoke, or fumes   Negative: Sounds like a life-threatening emergency to the triager   Negative: Previous asthma attacks and this feels like asthma attack   Negative: Dry cough (non-productive; no sputum or minimal clear sputum) and within 14 days of COVID-19 Exposure   Negative: MODERATE difficulty breathing (e.g., speaks in phrases, SOB even at rest, pulse 100-120) and still present when not coughing   Negative: Chest pain present when not coughing   Negative: Passed out (i.e., fainted, collapsed and was not responding)   Negative: Patient sounds very sick or weak to the triager   Negative: MILD difficulty breathing (e.g., minimal/no SOB at rest, SOB with walking, pulse <100) and still present when not coughing   Negative: Coughed up > 1 tablespoon (15 ml) blood (Exception: Blood-tinged sputum.)   Negative: Fever > 103 F (39.4 C)   Negative: Fever > 101 F (38.3 C) and over 60 years of age   Negative: Fever > 100.0 F (37.8 C) and has diabetes mellitus or a weak immune system (e.g., HIV positive, cancer chemotherapy, organ transplant, splenectomy, chronic steroids)   Negative: Fever > 100.0 F (37.8 C) and bedridden (e.g., CVA, chronic illness, recovering from surgery)   Negative: Increasing ankle swelling   Negative: Wheezing is present   Negative: SEVERE coughing spells (e.g., whooping sound after coughing, vomiting  after coughing)   Negative: Coughing up lizbeth-colored (reddish-brown) or blood-tinged sputum   Negative: Fever present > 3 days (72 hours)   Negative: Fever returns after gone for over 24 hours and symptoms worse or not improved   Negative: Using nasal washes and pain medicine > 24 hours and sinus pain persists   Negative: Known COPD or other severe lung disease (i.e., bronchiectasis, cystic fibrosis, lung surgery) and worsening symptoms (i.e., increased sputum purulence or amount, increased breathing difficulty)   Negative: Continuous (nonstop) coughing interferes with work or school and no improvement using cough treatment per Care Advice    Protocols used: Cough-A-OH

## 2024-10-17 NOTE — TELEPHONE ENCOUNTER
Pt reviewed and responded to [employer] message     Oxana DANIEL, Triage RN  [facility]

## 2024-10-17 NOTE — TELEPHONE ENCOUNTER
Angie Multani   to [facility] [place] (supporting Elsa Wellington MD)         10/17/24  9:41 AM  Hi Dr Wellington,      I'm just writing to about concerns I have with illnesses.  I have been generally well, so this is different.       I have been sick since basically Sept 2nd.  I have gone to [facility] urgent care several times, which I believe are included in my chart.       Sept 4th diagnosed with Bronchitis and L ear infection.  Treated with Amoxicillin/7 days. Still had symptoms and returned Bronchitis gone, but coughing/drainage got Prednisone 4 days.         Was well for about 5 days then got ill again.  Returned for diagnosis of sinus infection and L ear infection. Put on Doxycycline for 7 days and Prednisone 4 days.         Again was well about 5 days, then after a rough period of work with very long hours, got ill again.  Which is my current state.  I don't  feel I have bronchitis or sinus infection this time. But, tons of coughing and drainage. Neither of the previous times did I even realize I had an ear infection, which is also the case here.          I just mentioned this to a physician I work with that I'm just so tired of being sick.  She thought I should check in with you.. So that is what I am doing.   I guess I wouldn't go to urgent care yet. But,  this does affect my sleep and daily life.  I have been treating all with dayquil, night quilt, or musinex dm for sinus infection,  and coughing pearls.           I do have ragweed allergies. So in the non-illness days I have used zyrtec-d.           Let me know if you think I should do anything.        Thank you,       Angie Multani      See  message from pt above.    Patient Contact  Attempt # 1  Was call answered?  No.  Left message on voicemail with information to call triage back. On call back, please triage cough.

## 2024-10-17 NOTE — TELEPHONE ENCOUNTER
Elsa Wellingotn MD Begin, David A RN; [facility]11 minutes ago (1:28 PM)       We can just observe but she might need to see me if after 6-week of cough, cough has persisted or if she is short of breath now, which by triage she is not   Patient Contact    Attempt # 1    Was call answered?  No.  Left message on [employer] with information to call back or review message on [employer]. Sent [employer] message to pt     Oxana DANIEL, Triage RN  [facility]

## 2024-11-06 ENCOUNTER — MYC MEDICAL ADVICE (OUTPATIENT)
Dept: FAMILY MEDICINE | Facility: CLINIC | Age: 64
End: 2024-11-06
Payer: COMMERCIAL

## 2024-11-07 ENCOUNTER — OFFICE VISIT (OUTPATIENT)
Dept: FAMILY MEDICINE | Facility: CLINIC | Age: 64
End: 2024-11-07
Payer: COMMERCIAL

## 2024-11-07 VITALS
HEIGHT: 63 IN | OXYGEN SATURATION: 96 % | SYSTOLIC BLOOD PRESSURE: 138 MMHG | WEIGHT: 222.5 LBS | TEMPERATURE: 97.8 F | RESPIRATION RATE: 10 BRPM | DIASTOLIC BLOOD PRESSURE: 84 MMHG | HEART RATE: 89 BPM | BODY MASS INDEX: 39.42 KG/M2

## 2024-11-07 DIAGNOSIS — J06.9 VIRAL URI WITH COUGH: Primary | ICD-10-CM

## 2024-11-07 LAB
FLUAV RNA SPEC QL NAA+PROBE: NEGATIVE
FLUBV RNA RESP QL NAA+PROBE: NEGATIVE
RSV RNA SPEC NAA+PROBE: NEGATIVE
SARS-COV-2 RNA RESP QL NAA+PROBE: NEGATIVE

## 2024-11-07 PROCEDURE — 87637 SARSCOV2&INF A&B&RSV AMP PRB: CPT | Performed by: NURSE PRACTITIONER

## 2024-11-07 PROCEDURE — 99213 OFFICE O/P EST LOW 20 MIN: CPT | Performed by: NURSE PRACTITIONER

## 2024-11-07 PROCEDURE — G2211 COMPLEX E/M VISIT ADD ON: HCPCS | Performed by: NURSE PRACTITIONER

## 2024-11-07 ASSESSMENT — ENCOUNTER SYMPTOMS: COUGH: 1

## 2024-11-07 ASSESSMENT — PAIN SCALES - GENERAL: PAINLEVEL_OUTOF10: NO PAIN (0)

## 2024-11-07 NOTE — PROGRESS NOTES
"  Assessment & Plan     Viral URI with cough  Suspect viral etiology. She has been sick 4 times in the last 2 months. She is concerned about underlying etiology. Less likely underlying etiology as she has recovered from each of these illnesses. Discussed Symptomatic treatments. Follow-up with any new or worsening symptoms   - Symptomatic Influenza A/B, RSV, & SARS-CoV2 PCR (COVID-19); Future  - Symptomatic Influenza A/B, RSV, & SARS-CoV2 PCR (COVID-19) Nasopharyngeal        Subjective   Angie is a 63 year old, presenting for the following health issues:  Cough and Sinus Problem    History of Present Illness       Reason for visit:  Nasal and chest congestion, cough.  Repeated occurrence since   Symptom onset:  3-7 days ago  Symptom intensity:  Moderate  Symptom progression:  Staying the same  Had these symptoms before:  Yes  Has tried/received treatment for these symptoms:  Yes  Previous treatment was successful:  Yes  Prior treatment description:  Amoxicillin, prednisone,  cough pearls....this is the 4th time of getting these symptoms since .   She is taking medications regularly.     3-4 days of above symptoms   No fevers   Taking dayquil, nyquil, tessalon     Initially tx for bronchitis with amox 2 months ago. Didn't work so went back in and got prednisone. Got better for 5 days and got another sinus infection and again got another abx. Got sick again 5 days later and went away on its own.   Gut is good     Has a lot of stress   Was working a lot. Traveled to [place] for a . Has been nonstop.   Has seasonal allergies also   Works as cardiac tech.       Review of Systems  Detailed as above         Objective    /84 (BP Location: Right arm, Patient Position: Sitting, Cuff Size: Adult Large)   Pulse 89   Temp 97.8  F (36.6  C) (Tympanic)   Resp 10   Ht 1.6 m (5' 3\")   Wt 100.9 kg (222 lb 8 oz)   LMP 2014   SpO2 96%   BMI 39.41 kg/m    There is no height or weight on file to " calculate BMI.  Physical Exam  Constitutional:       Appearance: Normal appearance.   HENT:      Head: Normocephalic.      Right Ear: Tympanic membrane, ear canal and external ear normal.      Left Ear: Tympanic membrane, ear canal and external ear normal.      Nose:      Comments: Swelling and slight erythema of nasal mucosa     Mouth/Throat:      Pharynx: No oropharyngeal exudate.      Comments: Posterior cobblestoning  Eyes:      Conjunctiva/sclera: Conjunctivae normal.   Cardiovascular:      Rate and Rhythm: Normal rate and regular rhythm.      Heart sounds: Normal heart sounds. No murmur heard.  Pulmonary:      Effort: Pulmonary effort is normal. No respiratory distress.      Breath sounds: Normal breath sounds.   Musculoskeletal:      Cervical back: Normal range of motion.   Lymphadenopathy:      Cervical: No cervical adenopathy.   Skin:     General: Skin is warm and dry.   Neurological:      Mental Status: She is alert.   Psychiatric:         Mood and Affect: Mood normal.                    Signed Electronically by: ZIA Luong CNP

## 2024-12-26 ENCOUNTER — PATIENT OUTREACH (OUTPATIENT)
Dept: CARE COORDINATION | Facility: CLINIC | Age: 64
End: 2024-12-26
Payer: COMMERCIAL

## 2025-02-03 ENCOUNTER — OFFICE VISIT (OUTPATIENT)
Dept: FAMILY MEDICINE | Facility: CLINIC | Age: 65
End: 2025-02-03
Payer: COMMERCIAL

## 2025-02-03 ENCOUNTER — ANCILLARY PROCEDURE (OUTPATIENT)
Dept: MAMMOGRAPHY | Facility: CLINIC | Age: 65
End: 2025-02-03
Attending: INTERNAL MEDICINE
Payer: COMMERCIAL

## 2025-02-03 VITALS
OXYGEN SATURATION: 98 % | SYSTOLIC BLOOD PRESSURE: 120 MMHG | HEART RATE: 70 BPM | WEIGHT: 224.5 LBS | DIASTOLIC BLOOD PRESSURE: 80 MMHG | RESPIRATION RATE: 16 BRPM | HEIGHT: 64 IN | TEMPERATURE: 96.6 F | BODY MASS INDEX: 38.33 KG/M2

## 2025-02-03 DIAGNOSIS — E66.01 CLASS 3 SEVERE OBESITY IN ADULT, UNSPECIFIED BMI, UNSPECIFIED OBESITY TYPE, UNSPECIFIED WHETHER SERIOUS COMORBIDITY PRESENT (H): ICD-10-CM

## 2025-02-03 DIAGNOSIS — Z12.31 VISIT FOR SCREENING MAMMOGRAM: ICD-10-CM

## 2025-02-03 DIAGNOSIS — D22.9 MULTIPLE BENIGN NEVI: ICD-10-CM

## 2025-02-03 DIAGNOSIS — R03.0 ELEVATED BP WITHOUT DIAGNOSIS OF HYPERTENSION: ICD-10-CM

## 2025-02-03 DIAGNOSIS — B37.2 INTERTRIGINOUS CANDIDIASIS: ICD-10-CM

## 2025-02-03 DIAGNOSIS — E66.813 CLASS 3 SEVERE OBESITY IN ADULT, UNSPECIFIED BMI, UNSPECIFIED OBESITY TYPE, UNSPECIFIED WHETHER SERIOUS COMORBIDITY PRESENT (H): ICD-10-CM

## 2025-02-03 DIAGNOSIS — M22.40 CHONDROMALACIA OF PATELLA, UNSPECIFIED LATERALITY: ICD-10-CM

## 2025-02-03 DIAGNOSIS — F43.21 SITUATIONAL DEPRESSION: ICD-10-CM

## 2025-02-03 DIAGNOSIS — Z13.6 CARDIOVASCULAR SCREENING; LDL GOAL LESS THAN 100: ICD-10-CM

## 2025-02-03 DIAGNOSIS — J30.89 SEASONAL ALLERGIC RHINITIS DUE TO OTHER ALLERGIC TRIGGER: ICD-10-CM

## 2025-02-03 DIAGNOSIS — Z00.00 ROUTINE GENERAL MEDICAL EXAMINATION AT A HEALTH CARE FACILITY: Primary | ICD-10-CM

## 2025-02-03 LAB
ALBUMIN SERPL BCG-MCNC: 4.1 G/DL (ref 3.5–5.2)
ALP SERPL-CCNC: 88 U/L (ref 40–150)
ALT SERPL W P-5'-P-CCNC: 22 U/L (ref 0–50)
ANION GAP SERPL CALCULATED.3IONS-SCNC: 10 MMOL/L (ref 7–15)
AST SERPL W P-5'-P-CCNC: 20 U/L (ref 0–45)
BILIRUB SERPL-MCNC: 0.7 MG/DL
BUN SERPL-MCNC: 11.2 MG/DL (ref 8–23)
CALCIUM SERPL-MCNC: 9.4 MG/DL (ref 8.8–10.4)
CHLORIDE SERPL-SCNC: 106 MMOL/L (ref 98–107)
CHOLEST SERPL-MCNC: 182 MG/DL
CREAT SERPL-MCNC: 0.87 MG/DL (ref 0.51–0.95)
EGFRCR SERPLBLD CKD-EPI 2021: 75 ML/MIN/1.73M2
ERYTHROCYTE [DISTWIDTH] IN BLOOD BY AUTOMATED COUNT: 13.5 % (ref 10–15)
EST. AVERAGE GLUCOSE BLD GHB EST-MCNC: 111 MG/DL
FASTING STATUS PATIENT QL REPORTED: YES
FASTING STATUS PATIENT QL REPORTED: YES
GLUCOSE SERPL-MCNC: 104 MG/DL (ref 70–99)
HBA1C MFR BLD: 5.5 % (ref 0–5.6)
HCO3 SERPL-SCNC: 25 MMOL/L (ref 22–29)
HCT VFR BLD AUTO: 45.1 % (ref 35–47)
HDLC SERPL-MCNC: 55 MG/DL
HGB BLD-MCNC: 14.5 G/DL (ref 11.7–15.7)
LDLC SERPL CALC-MCNC: 105 MG/DL
MCH RBC QN AUTO: 27.9 PG (ref 26.5–33)
MCHC RBC AUTO-ENTMCNC: 32.2 G/DL (ref 31.5–36.5)
MCV RBC AUTO: 87 FL (ref 78–100)
NONHDLC SERPL-MCNC: 127 MG/DL
PLATELET # BLD AUTO: 310 10E3/UL (ref 150–450)
POTASSIUM SERPL-SCNC: 4.2 MMOL/L (ref 3.4–5.3)
PROT SERPL-MCNC: 7 G/DL (ref 6.4–8.3)
RBC # BLD AUTO: 5.2 10E6/UL (ref 3.8–5.2)
SODIUM SERPL-SCNC: 141 MMOL/L (ref 135–145)
TRIGL SERPL-MCNC: 112 MG/DL
TSH SERPL DL<=0.005 MIU/L-ACNC: 1.35 UIU/ML (ref 0.3–4.2)
VIT B12 SERPL-MCNC: 488 PG/ML (ref 232–1245)
WBC # BLD AUTO: 4.4 10E3/UL (ref 4–11)

## 2025-02-03 PROCEDURE — 77063 BREAST TOMOSYNTHESIS BI: CPT | Mod: TC | Performed by: RADIOLOGY

## 2025-02-03 PROCEDURE — 85027 COMPLETE CBC AUTOMATED: CPT | Performed by: INTERNAL MEDICINE

## 2025-02-03 PROCEDURE — 77067 SCR MAMMO BI INCL CAD: CPT | Mod: TC | Performed by: RADIOLOGY

## 2025-02-03 PROCEDURE — 80053 COMPREHEN METABOLIC PANEL: CPT | Performed by: INTERNAL MEDICINE

## 2025-02-03 PROCEDURE — 82607 VITAMIN B-12: CPT | Performed by: INTERNAL MEDICINE

## 2025-02-03 PROCEDURE — 84443 ASSAY THYROID STIM HORMONE: CPT | Performed by: INTERNAL MEDICINE

## 2025-02-03 PROCEDURE — 36415 COLL VENOUS BLD VENIPUNCTURE: CPT | Performed by: INTERNAL MEDICINE

## 2025-02-03 PROCEDURE — 83036 HEMOGLOBIN GLYCOSYLATED A1C: CPT | Performed by: INTERNAL MEDICINE

## 2025-02-03 PROCEDURE — 80061 LIPID PANEL: CPT | Performed by: INTERNAL MEDICINE

## 2025-02-03 PROCEDURE — 99396 PREV VISIT EST AGE 40-64: CPT | Performed by: INTERNAL MEDICINE

## 2025-02-03 PROCEDURE — 99214 OFFICE O/P EST MOD 30 MIN: CPT | Mod: 25 | Performed by: INTERNAL MEDICINE

## 2025-02-03 RX ORDER — KETOCONAZOLE 20 MG/G
CREAM TOPICAL DAILY
Qty: 30 G | Refills: 3 | Status: SHIPPED | OUTPATIENT
Start: 2025-02-03

## 2025-02-03 RX ORDER — LORAZEPAM 0.5 MG/1
0.5 TABLET ORAL DAILY PRN
Qty: 15 TABLET | Refills: 1 | Status: SHIPPED | OUTPATIENT
Start: 2025-02-03

## 2025-02-03 SDOH — HEALTH STABILITY: PHYSICAL HEALTH: ON AVERAGE, HOW MANY DAYS PER WEEK DO YOU ENGAGE IN MODERATE TO STRENUOUS EXERCISE (LIKE A BRISK WALK)?: 0 DAYS

## 2025-02-03 SDOH — HEALTH STABILITY: PHYSICAL HEALTH: ON AVERAGE, HOW MANY MINUTES DO YOU ENGAGE IN EXERCISE AT THIS LEVEL?: 0 MIN

## 2025-02-03 ASSESSMENT — PAIN SCALES - GENERAL: PAINLEVEL_OUTOF10: NO PAIN (0)

## 2025-02-03 ASSESSMENT — SOCIAL DETERMINANTS OF HEALTH (SDOH): HOW OFTEN DO YOU GET TOGETHER WITH FRIENDS OR RELATIVES?: ONCE A WEEK

## 2025-02-03 NOTE — PROGRESS NOTES
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 due to her son's healthcare related issues  Her eating is erratic because of that and she tries to exercise  Her mammogram is every year and she is up to date on colon exam and Pap smear  Class 3 severe obesity in adult, unspecified BMI, unspecified obesity type, unspecified whether serious comorbidity present (H)  Patient's BMI is 38.54 complicated by intertrigo and arthritis  And hyperlipidemia  She will benefit from GLP agonist drugs to prevent diabetes also  We discussed the side effects at length  - CBC with Platelets    - Comprehensive metabolic panel  - HEMOGLOBIN A1C  - Vitamin B12  - TSH with free T4 reflex  - REVIEW OF HEALTH MAINTENANCE PROTOCOL ORDERS  - Semaglutide-Weight Management (WEGOVY) 1 MG/0.5ML pen  Dispense: 2 mL; Refill: 0  - Semaglutide-Weight Management (WEGOVY) 0.5 MG/0.5ML pen  Dispense: 2 mL; Refill: 0  - semaglutide-weight management (WEGOVY) 0.25 MG/0.5ML pen  Dispense: 2 mL; Refill: 0    Intertriginous candidiasis  We will keep the area dry and cotton undergarments are recommended  - ketoconazole (NIZORAL) 2 % external cream  Dispense: 30 g; Refill: 3    Situational depression  We discussed about antidepressants  They will use lorazepam as needed and counseling  - Adult Mental Health  Referral  - LORazepam (ATIVAN) 0.5 MG tablet  Dispense: 15 tablet; Refill: 1    Multiple benign nevi  Patient is seeing dermatology  - REVIEW OF HEALTH MAINTENANCE PROTOCOL ORDERS    Chondromalacia of patella, unspecified laterality  Weight loss will certainly help  - REVIEW OF HEALTH MAINTENANCE PROTOCOL ORDERS  - Semaglutide-Weight Management (WEGOVY) 1 MG/0.5ML pen  Dispense: 2 mL; Refill: 0  - Semaglutide-Weight Management  "(WEGOVY) 0.5 MG/0.5ML pen  Dispense: 2 mL; Refill: 0  - semaglutide-weight management (WEGOVY) 0.25 MG/0.5ML pen  Dispense: 2 mL; Refill: 0    Elevated BP without diagnosis of hypertension  We will continue home blood pressure monitoring  - Semaglutide-Weight Management (WEGOVY) 1 MG/0.5ML pen  Dispense: 2 mL; Refill: 0  - Semaglutide-Weight Management (WEGOVY) 0.5 MG/0.5ML pen  Dispense: 2 mL; Refill: 0  - semaglutide-weight management (WEGOVY) 0.25 MG/0.5ML pen  Dispense: 2 mL; Refill: 0    Seasonal allergic rhinitis due to other allergic trigger  Patient is on Nasonex    Visit for screening mammogram    - MA Screening Bilateral w/ Gumaro    CARDIOVASCULAR SCREENING; LDL GOAL LESS THAN 100  Patient has hyperlipidemia and might need CT calcium score  - Lipid panel reflex to direct LDL Fasting  - REVIEW OF HEALTH MAINTENANCE PROTOCOL ORDERS              BMI  Estimated body mass index is 38.54 kg/m  as calculated from the following:    Height as of this encounter: 1.626 m (5' 4\").    Weight as of this encounter: 101.8 kg (224 lb 8 oz).       Counseling  Appropriate preventive services were addressed with this patient via screening, questionnaire, or discussion as appropriate for fall prevention, nutrition, physical activity,   Checklist reviewing preventive services available has been given to the patient.  Reviewed patient's diet, addressing concerns and/or questions.   She is at risk for psychosocial distress and has been provided with information to reduce risk.           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 Good   Feel stress (tense, anxious, or unable to sleep) To some extent   (!) STRESS CONCERN      2/3/2025   Nutrition   Three or more servings of calcium each day? Yes   Diet: Regular (no restrictions)   How many servings of fruit and vegetables per day? (!) 2-3   How many sweetened beverages each day? 0-1         2/3/2025   Exercise   Days per week of moderate/strenous exercise 0 days   Average minutes spent exercising at this level 0 min   (!) EXERCISE CONCERN      2/3/2025   Social Factors   Frequency of gathering with friends or relatives Once a week   Worry food won't last until get money to buy more No   Food not last or not have enough money for food? No   Do you have housing? (Housing is defined as stable permanent housing and does not include staying ouside in a car, in a tent, in an abandoned building, in an overnight shelter, or couch-surfing.) Yes   Are you worried about losing your housing? No   Lack of transportation? No   Unable to get utilities (heat,electricity)? No         2/3/2025   Fall Risk   Fallen 2 or more times in the past year? No   Trouble with walking or balance? No          2/3/2025   Dental   Dentist two times every year? Yes         2/3/2025   TB Screening   Were you born outside of the [place]? No         Today's PHQ-2 Score:       2/3/2025     7:02 AM   PHQ-2 ( 1999 [employer])   Q1: Little interest or pleasure in doing things 0   Q2: Feeling down, depressed or hopeless 1   PHQ-2 Score 1    Q1: Little interest or pleasure in doing things Not at all   Q2: Feeling down, depressed or hopeless Several days   PHQ-2 Score 1       Patient-reported           2/3/2025   Substance Use   Alcohol more than 3/day or more than 7/wk No   Do you use any other substances recreationally? No     Social History     Tobacco Use    Smoking  status: Never    Smokeless tobacco: Never   Substance Use Topics    Alcohol use: Yes     Comment: 3-5 drinks/week    Drug use: No           1/23/2024   LAST FHS-7 RESULTS   1st degree relative breast or ovarian cancer No   Any relative bilateral breast cancer No   Any male have breast cancer No   Any ONE woman have BOTH breast AND ovarian cancer No   Any woman with breast cancer before 50yrs No   2 or more relatives with breast AND/OR ovarian cancer No   2 or more relatives with breast AND/OR bowel cancer No        Mammogram Screening - Mammogram every 1-2 years updated in Health Maintenance based on mutual decision making        2/3/2025   STI Screening   New sexual partner(s) since last STI/HIV test? No     History of abnormal Pap smear: No - age 30-64 HPV with reflex Pap every 5 years recommended        Latest Ref Rng & Units 1/22/2024    11:16 AM 2/10/2020     9:30 AM 2/10/2020     9:26 AM   PAP / HPV   PAP  Negative for Intraepithelial Lesion or Malignancy (NILM)      PAP (Historical)    NIL    HPV 16 DNA Negative Negative  Negative     HPV 18 DNA Negative Negative  Negative     Other HR HPV Negative Negative  Negative       ASCVD Risk   The 10-year ASCVD risk score (Rubi DEL VALLE, et al., 2019) is: 3.6%    Values used to calculate the score:      Age: 64 years      Sex: Female      Is Non- : No      Diabetic: No      Tobacco smoker: No      Systolic Blood Pressure: 120 mmHg      Is BP treated: No      HDL Cholesterol: 63 mg/dL      Total Cholesterol: 223 mg/dL           Reviewed and updated as needed this visit by Provider     Meds   Med Hx  Surg Hx  Fam Hx            BP Readings from Last 3 Encounters:   02/03/25 120/80   11/07/24 138/84   01/22/24 126/80    Wt Readings from Last 3 Encounters:   02/03/25 101.8 kg (224 lb 8 oz)   11/07/24 100.9 kg (222 lb 8 oz)   01/22/24 103.9 kg (229 lb)                  Patient Active Problem List   Diagnosis    Allergic rhinitis     Chondromalacia of patella    Obesity    CARDIOVASCULAR SCREENING; LDL GOAL LESS THAN 160    Psoriasis    Seasonal allergic rhinitis    Diarrhea, unspecified type    Solar lentigo    Cherry angioma    SK (seborrheic keratosis)    Multiple benign nevi     Past Surgical History:   Procedure Laterality Date    C DEXA,BONE DENSITY,APPENDICULR SKELTN  2009    nl bmd    COLONOSCOPY  2009    nl    ZZC NONSPECIFIC PROCEDURE      Tonsillectomy    ZZC NONSPECIFIC PROCEDURE       [id number]    ZZC NONSPECIFIC PROCEDURE  3/08    lt radial closed fracture        Social History     Tobacco Use    Smoking status: Never    Smokeless tobacco: Never   Substance Use Topics    Alcohol use: Yes     Comment: 3-5 drinks/week     Family History   Problem Relation Age of Onset    Macular Degeneration Mother     Depression Father     Depression Sister     Asthma Sister     Depression Brother     Depression Brother     Diabetes Maternal Grandmother     Hypertension Maternal Grandfather     Diabetes Maternal Grandfather     Circulatory Maternal Grandfather         bleeding prob    Diabetes Paternal Grandfather     Depression Daughter     Gastrointestinal Disease Son         colostomy    Alcoholism Son          Current Outpatient Medications   Medication Sig Dispense Refill    cetirizine-psuedoePHEDrine (ZYRTEC-D) 5-120 MG per tablet Take 1 tablet by mouth daily (Patient taking differently: Take 1 tablet by mouth daily as needed.) 60 tablet 2    fluocinolone (SYNALAR) 0.01 % solution Please re issue in drop formula rather than squeeze bottle 30 mL 1    fluocinolone acetonide oil 0.01 % ear drops INSTILL 5 DROPS INTO THE AFFECTED EAR TWICE DAILY 20 mL 4    fluticasone (FLONASE) 50 MCG/ACT nasal spray Spray 1 spray into both nostrils daily 16 g 3    hydroquinone (WAYNE) 4 % external cream Apply topically BID to the R lower leg for 12 weeks. 28.35 g 1    ketoconazole (NIZORAL) 2 % external cream Apply topically daily. 30 g 3    LORazepam (ATIVAN)  "0.5 MG tablet Take 1 tablet (0.5 mg) by mouth daily as needed for anxiety. 15 tablet 1    mometasone (NASONEX) 50 MCG/ACT nasal spray Spray 2 sprays into both nostrils daily (Patient taking differently: Spray 2 sprays into both nostrils daily as needed.) 1 Box 1    semaglutide-weight management (WEGOVY) 0.25 MG/0.5ML pen Inject 0.5 mLs (0.25 mg) subcutaneously once a week for 4 doses. 2 mL 0    Semaglutide-Weight Management (WEGOVY) 0.5 MG/0.5ML pen Inject 0.5 mg subcutaneously once a week for 4 doses. 2 mL 0    Semaglutide-Weight Management (WEGOVY) 1 MG/0.5ML pen Inject 1 mg subcutaneously once a week for 4 doses. 2 mL 0    tacrolimus (PROTOPIC) 0.1 % external ointment Apply topically to the face BID 60 g 4    triamcinolone (ARISTOCORT HP) 0.5 % external cream Apply sparingly to affected area three times daily. 60 g 3     Allergies   Allergen Reactions    No Known Drug Allergy     Seasonal Allergies          Review of Systems  Constitutional, HEENT, cardiovascular, pulmonary, GI, , musculoskeletal, neuro, skin, endocrine and psych systems are negative, except as otherwise noted.     Objective    Exam  /80   Pulse 70   Temp (!) 96.6  F (35.9  C) (Temporal)   Resp 16   Ht 1.626 m (5' 4\")   Wt 101.8 kg (224 lb 8 oz)   LMP 05/14/2014   SpO2 98%   BMI 38.54 kg/m     Estimated body mass index is 38.54 kg/m  as calculated from the following:    Height as of this encounter: 1.626 m (5' 4\").    Weight as of this encounter: 101.8 kg (224 lb 8 oz).    Physical Exam  GENERAL: alert and no distress  EYES: Eyes grossly normal to inspection, PERRL and conjunctivae and sclerae normal  HENT: ear canals and TM's normal, nose and mouth without ulcers or lesions  NECK: no adenopathy, no asymmetry, masses, or scars  RESP: lungs clear to auscultation - no rales, rhonchi or wheezes  BREAST: normal without masses, tenderness or nipple discharge and no palpable axillary masses or adenopathy  CV: regular rate and rhythm, " normal S1 S2, no S3 or S4, no murmur, click or rub, no peripheral edema  ABDOMEN: soft, nontender, no hepatosplenomegaly, no masses and bowel sounds normal  Intertrigo in the inguinal folds  MS: no gross musculoskeletal defects noted, no edema  SKIN: Multiple benign lesions sad and crying no suspicious lesions or rashes  NEURO: Normal strength and tone, mentation intact and speech normal  PSYCH: mentation appears normal, affect normal/bright        Signed Electronically by: Elsa Wellington MD

## 2025-02-13 ENCOUNTER — OFFICE VISIT (OUTPATIENT)
Dept: DERMATOLOGY | Facility: CLINIC | Age: 65
End: 2025-02-13
Payer: COMMERCIAL

## 2025-02-13 DIAGNOSIS — D22.9 MULTIPLE BENIGN NEVI: Primary | ICD-10-CM

## 2025-02-13 DIAGNOSIS — L40.9 PSORIASIS: ICD-10-CM

## 2025-02-13 DIAGNOSIS — L57.0 AK (ACTINIC KERATOSIS): ICD-10-CM

## 2025-02-13 DIAGNOSIS — L82.1 SK (SEBORRHEIC KERATOSIS): ICD-10-CM

## 2025-02-13 DIAGNOSIS — D18.01 CHERRY ANGIOMA: ICD-10-CM

## 2025-02-13 DIAGNOSIS — L57.8 PHOTOAGING OF SKIN: ICD-10-CM

## 2025-02-13 DIAGNOSIS — L81.4 SOLAR LENTIGO: ICD-10-CM

## 2025-02-13 PROCEDURE — 17000 DESTRUCT PREMALG LESION: CPT | Performed by: PHYSICIAN ASSISTANT

## 2025-02-13 PROCEDURE — 99214 OFFICE O/P EST MOD 30 MIN: CPT | Mod: 25 | Performed by: PHYSICIAN ASSISTANT

## 2025-02-13 RX ORDER — TACROLIMUS 1 MG/G
OINTMENT TOPICAL
Qty: 60 G | Refills: 4 | Status: SHIPPED | OUTPATIENT
Start: 2025-02-13

## 2025-02-13 RX ORDER — FLUOCINOLONE ACETONIDE 0.11 MG/ML
OIL AURICULAR (OTIC)
Qty: 20 ML | Refills: 4 | Status: SHIPPED | OUTPATIENT
Start: 2025-02-13

## 2025-02-13 RX ORDER — TRIAMCINOLONE ACETONIDE 5 MG/G
CREAM TOPICAL
Qty: 60 G | Refills: 3 | Status: SHIPPED | OUTPATIENT
Start: 2025-02-13

## 2025-02-13 ASSESSMENT — PAIN SCALES - GENERAL: PAINLEVEL_OUTOF10: NO PAIN (0)

## 2025-02-13 NOTE — PATIENT INSTRUCTIONS
# Photoaging Recommendations  - discussed briefly need for strict sun protection  - recommended skin ceuticals  - CE Ferulic  - Vit C serum, OTC retinol (prior to rx to make sure she can tolerate)  - PDL/IPL for lentigines  - continue cerave      Cryotherapy    What is it?  Use of a very cold liquid, such as liquid nitrogen, to freeze and destroy abnormal skin cells that need to be removed    What should I expect?  Tenderness and redness  A small blister that might grow and fill with dark purple blood. There may be crusting.  More than one treatment may be needed if the lesions do not go away.    How do I care for the treated area?  Gently wash the area with your hands when bathing.  Use a thin layer of Vaseline to help with healing. You may use a Band-Aid.   The area should heal within 7-10 days and may leave behind a pink or lighter color.   Do not use an antibiotic or Neosporin ointment.   You may take acetaminophen (Tylenol) for pain.     Call your Doctor if you have:  Severe pain  Signs of infection (warmth, redness, cloudy yellow drainage, and or a bad smell)  Questions or concerns    Who should I call with questions?      [facility]: [phone]      Health system: [phone]      For urgent needs outside of business hours call the [facility] at [phone]        and ask for the dermatology resident on call                Proper skin care from [facility]:    -Eliminate harsh soaps as they strip the natural oils from the skin, often resulting in dry itchy skin ( i.e. Dial, Zest, [place])  -Use mild soaps such as Cetaphil or Dove Sensitive Skin in the shower. You do not need to use soap on arms, legs, and trunk every time you shower unless visibly soiled.   -Avoid hot or cold showers.  -After showering, lightly dry off and apply moisturizing within 2-3 minutes. This will help trap moisture in the skin.   -Aggressive use of a  moisturizer at least 1-2 times a day to the entire body (including -Vanicream, Cetaphil, Aquaphor or Cerave) and moisturize hands after every washing.  -We recommend using moisturizers that come in a tub that needs to be scooped out, not a pump. This has more of an oil base. It will hold moisture in your skin much better than a water base moisturizer. The above recommended are non-pore clogging.      Wear a sunscreen with at least SPF 30 on your face, ears, neck and V of the chest daily. Wear sunscreen on other areas of the body if those areas are exposed to the sun throughout the day. Sunscreens can contain physical and/or chemical blockers. Physical blockers are less likely to clog pores, these include zinc oxide and titanium dioxide. Reapply every two hour and after swimming.     Sunscreen examples: https://www.ewg.org/sunscreen/    UV radiation  UVA radiation remains constant throughout the day and throughout the year. It is a longer wavelength than UVB and therefore penetrates deeper into the skin leading to immediate and delayed tanning, photoaging, and skin cancer. 70-80% of UVA and UVB radiation occurs between the hours of 10am-2pm.  UVB radiation  UVB radiation causes the most harmful effects and is more significant during the summer months. However, snow and ice can reflect UVB radiation leading to skin damage during the winter months as well. UVB radiation is responsible for tanning, burning, inflammation, delayed erythema (pinkness), pigmentation (brown spots), and skin cancer.     I recommend self monthly full body exams and yearly full body exams with a dermatology provider. If you develop a new or changing lesion please follow up for examination. Most skin cancers are pink and scaly or pink and pearly. However, we do see blue/brown/black skin cancers.  Consider the ABCDEs of melanoma when giving yourself your monthly full body exam ( don't forget the groin, buttocks, feet, toes, etc). A-asymmetry,  B-borders, C-color, D-diameter, E-elevation or evolving. If you see any of these changes please follow up in clinic. If you cannot see your back I recommend purchasing a hand held mirror to use with a larger wall mirror.       Checking for Skin Cancer  You can find cancer early by checking your skin each month. There are 3 kinds of skin cancer. They are melanoma, basal cell carcinoma, and squamous cell carcinoma. Doing monthly skin checks is the best way to find new marks or skin changes. Follow the instructions below for checking your skin.   The ABCDEs of checking moles for melanoma   Check your moles or growths for signs of melanoma using ABCDE:   Asymmetry: the sides of the mole or growth don t match  Border: the edges are ragged, notched, or blurred  Color: the color within the mole or growth varies  Diameter: the mole or growth is larger than 6 mm (size of a pencil eraser)  Evolving: the size, shape, or color of the mole or growth is changing (evolving is not shown in the images below)    Checking for other types of skin cancer  Basal cell carcinoma or squamous cell carcinoma have symptoms such as:     A spot or mole that looks different from all other marks on your skin  Changes in how an area feels, such as itching, tenderness, or pain  Changes in the skin's surface, such as oozing, bleeding, or scaliness  A sore that does not heal  New swelling or redness beyond the border of a mole    Who s at risk?  Anyone can get skin cancer. But you are at greater risk if you have:   Fair skin, light-colored hair, or light-colored eyes  Many moles or abnormal moles on your skin  A history of sunburns from sunlight or tanning beds  A family history of skin cancer  A history of exposure to radiation or chemicals  A weakened immune system  If you have had skin cancer in the past, you are at risk for recurring skin cancer.   How to check your skin  Do your monthly skin checkups in front of a full-length mirror. Check all  parts of your body, including your:   Head (ears, face, neck, and scalp)  Torso (front, back, and sides)  Arms (tops, undersides, upper, and lower armpits)  Hands (palms, backs, and fingers, including under the nails)  Buttocks and genitals  Legs (front, back, and sides)  Feet (tops, soles, toes, including under the nails, and between toes)  If you have a lot of moles, take digital photos of them each month. Make sure to take photos both up close and from a distance. These can help you see if any moles change over time.   Most skin changes are not cancer. But if you see any changes in your skin, call your doctor right away. Only he or she can diagnose a problem. If you have skin cancer, seeing your doctor can be the first step toward getting the treatment that could save your life.   [employer] last reviewed this educational content on 4/1/2019 2000-2020 [employer]. [address] [place] [address]. All rights reserved. This information is not intended as a substitute for professional medical care. Always follow your healthcare professional's instructions.       When should I call my doctor?  If you are worsening or not improving, please, contact us or seek urgent care as noted below.     Who should I call with questions (adults)?    [facility] [phone]  For urgent needs outside of business hours call the [facility] at [phone] and ask for the dermatology resident on call to be paged  If this is a medical emergency and you are unable to reach an ER, Call [phone]      If you need a prescription refill, please contact your pharmacy. Refills are approved or denied by our Physicians during normal business hours, Monday through Friday.  Per office policy, refills will not be granted if you have not been seen within the past year (or sooner depending on the condition).

## 2025-02-13 NOTE — PROGRESS NOTES
[facility] Dermatology Note  Encounter Date: Feb 13, 2025  Office Visit      Dermatology Problem List:  Last FBSC performed on 2/13/25    1. Psoriasis    -triamcinolone 0.5% for elbows, fluocinolone 0.01% drops for ears/scalp , tacrolimus 0.1% ointment for face  2. PIH of left shin    -Hydroquinone BID  3. AK S/p cryo  # LTM, L chest  - consider bx if still present in 3mo - ddx: irritation from her badge vs NMSC      Social: Used to be   ____________________________________________    Assessment & Plan:  # LTM, L chest - Ddx: irritation from her badge vs NMSC - consider bx if still present in 3mo  - L chest there is a 6 mm pink scaly papule     # AK x 1 L helix  - Edu on increased risk for skin cancer.   - Cryotherapy performed today, see procedure note below.    # Psoriasis. Elbows, ear canals, face, mark temporal scalp - improved with topicals, no joint pain.    - Will continue triamcinolone 0.5% for her elbows. Refilled Rx.    - Will continue fluocinolone 0.01% drops for her ears and scalp, Refilled Rx    - Will continue tacrolimus  0.1% ointment for her face. Refilled Rx.    - Recommend CeraVe or Cetaphil cleanser, as well as CeraVe moisturizer jar.   - edu on chronicity, autoimmune etiology and need for annual visits with PCP to monitor for co morbidities such as metabolic disease    # Benign findings: multiple benign nevi, lentigines, cherry angiomas, SKs  - edu on benign etiology  - Signs and Symptoms of non-melanoma skin cancer and ABCDEs of melanoma reviewed with patient. Patient encouraged to perform monthly self skin exams and educated on how to perform them. UV precautions reviewed with patient. Patient was asked about new or changing moles/lesions on body.   - Sunscreen: Apply 20 minutes prior to going outdoors and reapply every two hours, when wet or sweating. We recommend using an SPF 30 or higher, and to use one that is water resistant.     - RTC for changes    # Photo  aging ( Added to AVS)   - discussed the following briefly as well as need for strict sun protection  - recommended skin ceuticals UV Fusion tinted sunscreen  - CE Ferulic  - Vit C serum, OTC retinol (prior to rx to make sure she can tolerate)  - PDL/IPL for lentigines  - continue cerave for moisturizer    Procedures Performed:   CRYOTHERAPY PROCEDURE NOTE: 1 lesions in the above locations were treated with liquid nitrogen utilizing a 5-10sec thaw time. Patient was advised that the treated areas will become red, swollen, may develop a blister and then should crust and peal off in the next 1-2 weeks. Post-procedure instructions were provided.    Follow-up: 1 year(s) in-person, or earlier for new or changing lesions    Staff and scribe     Scribe Disclosure:   I, TAY MARCUS, am serving as a scribe; to document services personally performed by Page Leong PA-C -based on data collection and the provider's statements to me.     Provider Disclosure:  I agree with above History, Review of Systems, Physical exam and Plan.  I have reviewed the content of the documentation and have edited it as needed. I have personally performed the services documented here and the documentation accurately represents those services and the decisions I have made.      Electronically signed by:     All risks, benefits and alternatives were discussed with patient.  Patient is in agreement and understands the assessment and plan.  All questions were answered.    Page eLong PA-C, MPAS  [facility] [place]: Phone: [phone], Fax: [phone]  [facility]: Phone: [phone],  Fax: [phone]  [facility]: Phone: [phone], Fax: [phone]  ____________________________________________    CC: Skin Check (FBSC)      Reviewed patients past medical history and pertinent chart review prior to patient's visit today.     HPI:  Ms.  Joanie Multani is a 64 year old female who presents today as a return patient for [facility]. Today patient did not report any spots of concern. Patient also wanted to go through recommendations from last visit about photo aging.   She denies joint pain. Topicals are managing her PSO to her satisfaction.       Patient is otherwise feeling well, without additional concerns.    Labs:  N/A    Physical Exam:  Vitals: LMP 05/14/2014   SKIN: Full skin, which includes the head/face, both arms, chest, back, abdomen,both legs, genitalia and/or groin buttocks, digits and/or nails, was examined.   -  Hallman's skin type II, has <100 nevi  - There are dome shaped bright red papules on the trunk.   - Multiple regular brown pigmented macules and papules are identified on the trunk and extremities.   - L chest there is a 6 mm pink scaly papule    - Scattered brown macules on sun exposed areas.  - There are waxy stuck on tan to brown papules on the trunk  - Mild flaking of the R and L ear canal  - mild erythema of the bilat elbows.   - There is/are erythematous macules with overlying adherent scale on the: x 1 L helix  - No other lesions of concern on areas examined.     Medications:  Current Outpatient Medications   Medication Sig Dispense Refill    cetirizine-psuedoePHEDrine (ZYRTEC-D) 5-120 MG per tablet Take 1 tablet by mouth daily 60 tablet 2    fluocinolone (SYNALAR) 0.01 % solution Please re issue in drop formula rather than squeeze bottle 30 mL 1    fluocinolone acetonide oil 0.01 % ear drops INSTILL 5 DROPS INTO THE AFFECTED EAR TWICE DAILY 20 mL 4    fluticasone (FLONASE) 50 MCG/ACT nasal spray Spray 1 spray into both nostrils daily 16 g 3    hydroquinone (WAYNE) 4 % external cream Apply topically BID to the R lower leg for 12 weeks. 28.35 g 1    ketoconazole (NIZORAL) 2 % external cream Apply topically daily. 30 g 3    LORazepam (ATIVAN) 0.5 MG tablet Take 1 tablet (0.5 mg) by mouth daily as needed for anxiety. 15 tablet  1    mometasone (NASONEX) 50 MCG/ACT nasal spray Spray 2 sprays into both nostrils daily (Patient taking differently: Spray 2 sprays into both nostrils daily as needed.) 1 Box 1    semaglutide-weight management (WEGOVY) 0.25 MG/0.5ML pen Inject 0.5 mLs (0.25 mg) subcutaneously once a week for 4 doses. 2 mL 0    Semaglutide-Weight Management (WEGOVY) 0.5 MG/0.5ML pen Inject 0.5 mg subcutaneously once a week for 4 doses. 2 mL 0    Semaglutide-Weight Management (WEGOVY) 1 MG/0.5ML pen Inject 1 mg subcutaneously once a week for 4 doses. 2 mL 0    tacrolimus (PROTOPIC) 0.1 % external ointment Apply topically to the face BID 60 g 4    triamcinolone (ARISTOCORT HP) 0.5 % external cream Apply sparingly to affected area three times daily. 60 g 3     No current facility-administered medications for this visit.      Past Medical/Surgical History:   Patient Active Problem List   Diagnosis    Allergic rhinitis    Chondromalacia of patella    Obesity    CARDIOVASCULAR SCREENING; LDL GOAL LESS THAN 160    Psoriasis    Seasonal allergic rhinitis    Diarrhea, unspecified type    Solar lentigo    Cherry angioma    SK (seborrheic keratosis)    Multiple benign nevi     Past Medical History:   Diagnosis Date    Allergic rhinitis, cause unspecified     seasonal    Arthritis     left knee    Chondromalacia of patella     rt, saw ortho Dr. Siu had xray, MRI    Fracture closed of lower end of forearm 12/22/2008    skating    Obesity     Psoriasis 02/03/2014    Viral exanthem, unspecified     parvovirus, 3/04

## 2025-02-13 NOTE — LETTER
2/13/2025      Joanie Multani  [address]  [place] [address]      Dear Colleague,    Thank you for referring your patient, Joanie Multani, to the [facility]. Please see a copy of my visit note below.    [facility] Dermatology Note  Encounter Date: Feb 13, 2025  Office Visit      Dermatology Problem List:  Last FBSC performed on 2/13/25    1. Psoriasis    -triamcinolone 0.5% for elbows, fluocinolone 0.01% drops for ears/scalp , tacrolimus 0.1% ointment for face  2. PIH of left shin    -Hydroquinone BID  3. AK S/p cryo  # LTM, L chest  - consider bx if still present in 3mo - ddx: irritation from her badge vs NMSC      Social: Used to be   ____________________________________________    Assessment & Plan:  # LTM, L chest - Ddx: irritation from her badge vs NMSC - consider bx if still present in 3mo  - L chest there is a 6 mm pink scaly papule     # AK x 1 L helix  - Edu on increased risk for skin cancer.   - Cryotherapy performed today, see procedure note below.    # Psoriasis. Elbows, ear canals, face, mark temporal scalp - improved with topicals, no joint pain.    - Will continue triamcinolone 0.5% for her elbows. Refilled Rx.    - Will continue fluocinolone 0.01% drops for her ears and scalp, Refilled Rx    - Will continue tacrolimus  0.1% ointment for her face. Refilled Rx.    - Recommend CeraVe or Cetaphil cleanser, as well as CeraVe moisturizer jar.   - edu on chronicity, autoimmune etiology and need for annual visits with PCP to monitor for co morbidities such as metabolic disease    # Benign findings: multiple benign nevi, lentigines, cherry angiomas, SKs  - edu on benign etiology  - Signs and Symptoms of non-melanoma skin cancer and ABCDEs of melanoma reviewed with patient. Patient encouraged to perform monthly self skin exams and educated on how to perform them. UV precautions reviewed with patient. Patient was asked about new or changing  moles/lesions on body.   - Sunscreen: Apply 20 minutes prior to going outdoors and reapply every two hours, when wet or sweating. We recommend using an SPF 30 or higher, and to use one that is water resistant.     - RTC for changes    # Photo aging ( Added to AVS)   - discussed the following briefly as well as need for strict sun protection  - recommended skin ceuticals UV Fusion tinted sunscreen  - CE Ferulic  - Vit C serum, OTC retinol (prior to rx to make sure she can tolerate)  - PDL/IPL for lentigines  - continue cerave for moisturizer    Procedures Performed:   CRYOTHERAPY PROCEDURE NOTE: 1 lesions in the above locations were treated with liquid nitrogen utilizing a 5-10sec thaw time. Patient was advised that the treated areas will become red, swollen, may develop a blister and then should crust and peal off in the next 1-2 weeks. Post-procedure instructions were provided.    Follow-up: 1 year(s) in-person, or earlier for new or changing lesions    Staff and scribe     Scribe Disclosure:   I, TAY MARCUS, am serving as a scribe; to document services personally performed by Page Leong PA-C -based on data collection and the provider's statements to me.     Provider Disclosure:  I agree with above History, Review of Systems, Physical exam and Plan.  I have reviewed the content of the documentation and have edited it as needed. I have personally performed the services documented here and the documentation accurately represents those services and the decisions I have made.      Electronically signed by:     All risks, benefits and alternatives were discussed with patient.  Patient is in agreement and understands the assessment and plan.  All questions were answered.    Paeg Leong PA-C, MPAS  [facility] [place]: Phone: [phone], Fax: [phone]  [facility]: Phone: [phone],  Fax: [phone]  [facility] [place]: Phone: [phone], Fax: [phone]  ____________________________________________    CC: Skin Check (FBSC)      Reviewed patients past medical history and pertinent chart review prior to patient's visit today.     HPI:  Ms. Joanie Multani is a 64 year old female who presents today as a return patient for FBSC. Today patient did not report any spots of concern. Patient also wanted to go through recommendations from last visit about photo aging.   She denies joint pain. Topicals are managing her PSO to her satisfaction.       Patient is otherwise feeling well, without additional concerns.    Labs:  N/A    Physical Exam:  Vitals: LMP 05/14/2014   SKIN: Full skin, which includes the head/face, both arms, chest, back, abdomen,both legs, genitalia and/or groin buttocks, digits and/or nails, was examined.   -  Hallman's skin type II, has <100 nevi  - There are dome shaped bright red papules on the trunk.   - Multiple regular brown pigmented macules and papules are identified on the trunk and extremities.   - L chest there is a 6 mm pink scaly papule    - Scattered brown macules on sun exposed areas.  - There are waxy stuck on tan to brown papules on the trunk  - Mild flaking of the R and L ear canal  - mild erythema of the bilat elbows.   - There is/are erythematous macules with overlying adherent scale on the: x 1 L helix  - No other lesions of concern on areas examined.     Medications:  Current Outpatient Medications   Medication Sig Dispense Refill     cetirizine-psuedoePHEDrine (ZYRTEC-D) 5-120 MG per tablet Take 1 tablet by mouth daily 60 tablet 2     fluocinolone (SYNALAR) 0.01 % solution Please re issue in drop formula rather than squeeze bottle 30 mL 1     fluocinolone acetonide oil 0.01 % ear drops INSTILL 5 DROPS INTO THE AFFECTED EAR TWICE DAILY 20 mL 4     fluticasone (FLONASE) 50 MCG/ACT nasal spray Spray 1 spray into both nostrils daily 16 g 3     hydroquinone (WAYNE) 4 % external  cream Apply topically BID to the R lower leg for 12 weeks. 28.35 g 1     ketoconazole (NIZORAL) 2 % external cream Apply topically daily. 30 g 3     LORazepam (ATIVAN) 0.5 MG tablet Take 1 tablet (0.5 mg) by mouth daily as needed for anxiety. 15 tablet 1     mometasone (NASONEX) 50 MCG/ACT nasal spray Spray 2 sprays into both nostrils daily (Patient taking differently: Spray 2 sprays into both nostrils daily as needed.) 1 Box 1     semaglutide-weight management (WEGOVY) 0.25 MG/0.5ML pen Inject 0.5 mLs (0.25 mg) subcutaneously once a week for 4 doses. 2 mL 0     Semaglutide-Weight Management (WEGOVY) 0.5 MG/0.5ML pen Inject 0.5 mg subcutaneously once a week for 4 doses. 2 mL 0     Semaglutide-Weight Management (WEGOVY) 1 MG/0.5ML pen Inject 1 mg subcutaneously once a week for 4 doses. 2 mL 0     tacrolimus (PROTOPIC) 0.1 % external ointment Apply topically to the face BID 60 g 4     triamcinolone (ARISTOCORT HP) 0.5 % external cream Apply sparingly to affected area three times daily. 60 g 3     No current facility-administered medications for this visit.      Past Medical/Surgical History:   Patient Active Problem List   Diagnosis     Allergic rhinitis     Chondromalacia of patella     Obesity     CARDIOVASCULAR SCREENING; LDL GOAL LESS THAN 160     Psoriasis     Seasonal allergic rhinitis     Diarrhea, unspecified type     Solar lentigo     Cherry angioma     SK (seborrheic keratosis)     Multiple benign nevi     Past Medical History:   Diagnosis Date     Allergic rhinitis, cause unspecified     seasonal     Arthritis     left knee     Chondromalacia of patella     rt, saw ortho Dr. Siu had xray, MRI     Fracture closed of lower end of forearm 12/22/2008    skating     Obesity      Psoriasis 02/03/2014     Viral exanthem, unspecified     parvovirus, 3/04                        Again, thank you for allowing me to participate in the care of your patient.        Sincerely,        Page Leong,  YEMI    Electronically signed

## 2025-05-29 ENCOUNTER — VIRTUAL VISIT (OUTPATIENT)
Dept: FAMILY MEDICINE | Facility: CLINIC | Age: 65
End: 2025-05-29
Payer: COMMERCIAL

## 2025-05-29 DIAGNOSIS — E78.5 HYPERLIPIDEMIA LDL GOAL <100: Primary | ICD-10-CM

## 2025-05-29 DIAGNOSIS — E66.813 CLASS 3 SEVERE OBESITY IN ADULT, UNSPECIFIED BMI, UNSPECIFIED OBESITY TYPE, UNSPECIFIED WHETHER SERIOUS COMORBIDITY PRESENT (H): ICD-10-CM

## 2025-05-29 NOTE — PROGRESS NOTES
"Angie is a 64 year old who is being evaluated via a billable video visit.    How would you like to obtain your AVS? MyChart  If the video visit is dropped, the invitation should be resent by: Text to cell phone: [phone]  Will anyone else be joining your video visit? No      Assessment & Plan     Class 3 severe obesity in adult, unspecified BMI, unspecified obesity type, unspecified whether serious comorbidity present (H)  This is a very nice patient who has lost 20 pounds of weight with GLP agonists  She is taking sublingual semaglutide now and escalating the dose and has some heartburn but no side effects  She will report every 3 months to me but she will see me in February  We will check the labs in July which were ordered for her    Hyperlipidemia LDL goal <100  As above          BMI  Estimated body mass index is 38.54 kg/m  as calculated from the following:    Height as of 2/3/25: 1.626 m (5' 4\").    Weight as of 2/3/25: 101.8 kg (224 lb 8 oz).             Subjective   Angie is a 64 year old, presenting for the following health issues: Patient was on semaglutide and lost 20 pounds  She now weighs 205 pounds but unfortunately compounding pharmacy does not compound the drug anymore  She is now on sublingual preparation  She has some heartburn which is well-controlled with Tums  Recheck Medication    History of Present Illness       Reason for visit:  Review of meds for weight loss    She eats 2-3 servings of fruits and vegetables daily.She consumes 1 sweetened beverage(s) daily.She exercises with enough effort to increase her heart rate 10 to 19 minutes per day.  She exercises with enough effort to increase her heart rate 3 or less days per week.   She is taking medications regularly.                Review of Systems  Constitutional, HEENT, cardiovascular, pulmonary, GI, , musculoskeletal, neuro, skin, endocrine and psych systems are negative, except as otherwise noted.      Objective    Vitals - Patient " "Reported  Weight (Patient Reported): 93 kg (205 lb)  Height (Patient Reported): 162.6 cm (5' 4\")  BMI (Based on Pt Reported Ht/Wt): 35.19  Pain Score: No Pain (0)        Physical Exam   GENERAL: alert and no distress  EYES: Eyes grossly normal to inspection.  No discharge or erythema, or obvious scleral/conjunctival abnormalities.  RESP: No audible wheeze, cough, or visible cyanosis.    SKIN: Visible skin clear. No significant rash, abnormal pigmentation or lesions.  NEURO: Cranial nerves grossly intact.  Mentation and speech appropriate for age.  PSYCH: Appropriate affect, tone, and pace of words          Video-Visit Details    Type of service:  Video Visit   Originating Location (pt. Location): Home    Distant Location (provider location):  On-site  Platform used for Video Visit: Ash  Signed Electronically by: Elsa Wellington MD    "

## 2025-08-06 ENCOUNTER — HOSPITAL ENCOUNTER (OUTPATIENT)
Dept: NUTRITION | Facility: CLINIC | Age: 65
Discharge: HOME | End: 2025-08-06
Attending: INTERNAL MEDICINE | Admitting: INTERNAL MEDICINE
Payer: COMMERCIAL

## 2025-08-06 DIAGNOSIS — E66.813 CLASS 3 SEVERE OBESITY IN ADULT, UNSPECIFIED BMI, UNSPECIFIED OBESITY TYPE, UNSPECIFIED WHETHER SERIOUS COMORBIDITY PRESENT (H): ICD-10-CM

## 2025-08-06 PROCEDURE — 97802 MEDICAL NUTRITION INDIV IN: CPT | Performed by: DIETITIAN, REGISTERED
